# Patient Record
Sex: FEMALE | Race: BLACK OR AFRICAN AMERICAN | Employment: FULL TIME | URBAN - METROPOLITAN AREA
[De-identification: names, ages, dates, MRNs, and addresses within clinical notes are randomized per-mention and may not be internally consistent; named-entity substitution may affect disease eponyms.]

---

## 2021-05-18 LAB
ANTIBODY SCREEN, EXTERNAL: NEGATIVE
HBSAG, EXTERNAL: NEGATIVE
HIV, EXTERNAL: NONREACTIVE
RUBELLA, EXTERNAL: NORMAL
T. PALLIDUM, EXTERNAL: NONREACTIVE
TYPE, ABO & RH, EXTERNAL: NORMAL

## 2021-11-26 LAB — GRBS, EXTERNAL: POSITIVE

## 2021-12-03 ENCOUNTER — HOSPITAL ENCOUNTER (INPATIENT)
Age: 31
LOS: 3 days | Discharge: HOME | End: 2021-12-06
Attending: OBSTETRICS & GYNECOLOGY | Admitting: OBSTETRICS & GYNECOLOGY
Payer: COMMERCIAL

## 2021-12-03 DIAGNOSIS — R52 POSTPARTUM PAIN: Primary | ICD-10-CM

## 2021-12-03 PROBLEM — O13.9 GESTATIONAL HYPERTENSION: Status: ACTIVE | Noted: 2021-12-03

## 2021-12-03 PROBLEM — O24.419 GESTATIONAL DIABETES: Status: ACTIVE | Noted: 2021-12-03

## 2021-12-03 PROBLEM — O36.8190 DECREASED FETAL MOVEMENT: Status: ACTIVE | Noted: 2021-12-03

## 2021-12-03 LAB
ALBUMIN SERPL-MCNC: 3 G/DL (ref 3.5–5)
ALBUMIN/GLOB SERPL: 0.8 {RATIO} (ref 1.1–2.2)
ALP SERPL-CCNC: 202 U/L (ref 45–117)
ALT SERPL-CCNC: 19 U/L (ref 12–78)
ANION GAP SERPL CALC-SCNC: 11 MMOL/L (ref 5–15)
AST SERPL-CCNC: 28 U/L (ref 15–37)
BILIRUB SERPL-MCNC: 0.9 MG/DL (ref 0.2–1)
BUN SERPL-MCNC: 3 MG/DL (ref 6–20)
BUN/CREAT SERPL: 6 (ref 12–20)
CALCIUM SERPL-MCNC: 9 MG/DL (ref 8.5–10.1)
CHLORIDE SERPL-SCNC: 107 MMOL/L (ref 97–108)
CO2 SERPL-SCNC: 20 MMOL/L (ref 21–32)
COVID-19 RAPID TEST, COVR: NOT DETECTED
CREAT SERPL-MCNC: 0.5 MG/DL (ref 0.55–1.02)
CREAT UR-MCNC: 59 MG/DL
ERYTHROCYTE [DISTWIDTH] IN BLOOD BY AUTOMATED COUNT: 13 % (ref 11.5–14.5)
GLOBULIN SER CALC-MCNC: 3.8 G/DL (ref 2–4)
GLUCOSE SERPL-MCNC: 66 MG/DL (ref 65–100)
HCT VFR BLD AUTO: 31.9 % (ref 35–47)
HGB BLD-MCNC: 10.8 G/DL (ref 11.5–16)
MCH RBC QN AUTO: 27.6 PG (ref 26–34)
MCHC RBC AUTO-ENTMCNC: 33.9 G/DL (ref 30–36.5)
MCV RBC AUTO: 81.4 FL (ref 80–99)
NRBC # BLD: 0 K/UL (ref 0–0.01)
NRBC BLD-RTO: 0 PER 100 WBC
PLATELET # BLD AUTO: 220 K/UL (ref 150–400)
PMV BLD AUTO: 10.3 FL (ref 8.9–12.9)
POTASSIUM SERPL-SCNC: 3.7 MMOL/L (ref 3.5–5.1)
PROT SERPL-MCNC: 6.8 G/DL (ref 6.4–8.2)
PROT UR-MCNC: 7 MG/DL (ref 0–11.9)
PROT/CREAT UR-RTO: 0.1
RBC # BLD AUTO: 3.92 M/UL (ref 3.8–5.2)
SODIUM SERPL-SCNC: 138 MMOL/L (ref 136–145)
SOURCE, COVRS: NORMAL
WBC # BLD AUTO: 10.7 K/UL (ref 3.6–11)

## 2021-12-03 PROCEDURE — 59200 INSERT CERVICAL DILATOR: CPT

## 2021-12-03 PROCEDURE — 74011250636 HC RX REV CODE- 250/636: Performed by: OBSTETRICS & GYNECOLOGY

## 2021-12-03 PROCEDURE — 74011000258 HC RX REV CODE- 258: Performed by: OBSTETRICS & GYNECOLOGY

## 2021-12-03 PROCEDURE — 85027 COMPLETE CBC AUTOMATED: CPT

## 2021-12-03 PROCEDURE — 87635 SARS-COV-2 COVID-19 AMP PRB: CPT

## 2021-12-03 PROCEDURE — 36415 COLL VENOUS BLD VENIPUNCTURE: CPT

## 2021-12-03 PROCEDURE — 65270000029 HC RM PRIVATE

## 2021-12-03 PROCEDURE — 75410000002 HC LABOR FEE PER 1 HR

## 2021-12-03 PROCEDURE — 84156 ASSAY OF PROTEIN URINE: CPT

## 2021-12-03 PROCEDURE — 80053 COMPREHEN METABOLIC PANEL: CPT

## 2021-12-03 RX ORDER — ONDANSETRON 2 MG/ML
4 INJECTION INTRAMUSCULAR; INTRAVENOUS
Status: DISCONTINUED | OUTPATIENT
Start: 2021-12-03 | End: 2021-12-04 | Stop reason: HOSPADM

## 2021-12-03 RX ORDER — TERBUTALINE SULFATE 1 MG/ML
0.25 INJECTION SUBCUTANEOUS AS NEEDED
Status: DISCONTINUED | OUTPATIENT
Start: 2021-12-03 | End: 2021-12-04 | Stop reason: HOSPADM

## 2021-12-03 RX ORDER — SODIUM CHLORIDE, SODIUM LACTATE, POTASSIUM CHLORIDE, CALCIUM CHLORIDE 600; 310; 30; 20 MG/100ML; MG/100ML; MG/100ML; MG/100ML
125 INJECTION, SOLUTION INTRAVENOUS CONTINUOUS
Status: DISCONTINUED | OUTPATIENT
Start: 2021-12-03 | End: 2021-12-05

## 2021-12-03 RX ORDER — OXYTOCIN/RINGER'S LACTATE 30/500 ML
10 PLASTIC BAG, INJECTION (ML) INTRAVENOUS AS NEEDED
Status: DISCONTINUED | OUTPATIENT
Start: 2021-12-03 | End: 2021-12-04 | Stop reason: HOSPADM

## 2021-12-03 RX ORDER — OXYTOCIN/RINGER'S LACTATE 30/500 ML
87.3 PLASTIC BAG, INJECTION (ML) INTRAVENOUS AS NEEDED
Status: DISCONTINUED | OUTPATIENT
Start: 2021-12-03 | End: 2021-12-04 | Stop reason: HOSPADM

## 2021-12-03 RX ORDER — ZOLPIDEM TARTRATE 5 MG/1
5 TABLET ORAL
Status: DISCONTINUED | OUTPATIENT
Start: 2021-12-03 | End: 2021-12-04 | Stop reason: HOSPADM

## 2021-12-03 RX ORDER — BUTORPHANOL TARTRATE 1 MG/ML
1 INJECTION INTRAMUSCULAR; INTRAVENOUS
Status: DISCONTINUED | OUTPATIENT
Start: 2021-12-03 | End: 2021-12-04

## 2021-12-03 RX ORDER — SODIUM CHLORIDE 0.9 % (FLUSH) 0.9 %
5-40 SYRINGE (ML) INJECTION AS NEEDED
Status: DISCONTINUED | OUTPATIENT
Start: 2021-12-03 | End: 2021-12-04 | Stop reason: HOSPADM

## 2021-12-03 RX ORDER — OXYTOCIN/RINGER'S LACTATE 30/500 ML
0-20 PLASTIC BAG, INJECTION (ML) INTRAVENOUS
Status: DISCONTINUED | OUTPATIENT
Start: 2021-12-04 | End: 2021-12-04 | Stop reason: HOSPADM

## 2021-12-03 RX ORDER — SODIUM CHLORIDE 0.9 % (FLUSH) 0.9 %
5-40 SYRINGE (ML) INJECTION EVERY 8 HOURS
Status: DISCONTINUED | OUTPATIENT
Start: 2021-12-03 | End: 2021-12-04 | Stop reason: HOSPADM

## 2021-12-03 RX ADMIN — BUTORPHANOL TARTRATE 1 MG: 1 INJECTION, SOLUTION INTRAMUSCULAR; INTRAVENOUS at 22:12

## 2021-12-03 RX ADMIN — PROMETHAZINE HYDROCHLORIDE 25 MG: 25 INJECTION INTRAMUSCULAR; INTRAVENOUS at 22:13

## 2021-12-03 RX ADMIN — SODIUM CHLORIDE, POTASSIUM CHLORIDE, SODIUM LACTATE AND CALCIUM CHLORIDE 125 ML/HR: 600; 310; 30; 20 INJECTION, SOLUTION INTRAVENOUS at 22:13

## 2021-12-03 NOTE — H&P
History & Physical    Name: Collette Nick MRN: [id number]  SSN: [id number]    YOB: 1990  Age: 33 y.o. Sex: female        Subjective:     Estimated Date of Delivery: None noted. OB History    Para Term  AB Living   1             SAB IAB Ectopic Molar Multiple Live Births                    # Outcome Date GA Lbr Bonilla/2nd Weight Sex Delivery Anes PTL Lv   1 Current                Ms. Aden Quinones is admitted with pregnancy at 41 0/7 for induction of labor. Prenatal course was complicated by GDMA1, GHTN, GBS positive with PCN and keflex allergies resistant to clindamycin, h/o HSV with no s/sx of active infection today. Please see prenatal records for details. Past Medical History:   Diagnosis Date    Delivery normal     vaginal birth     Past Surgical History:   Procedure Laterality Date    HX HEENT      wisdom teeth removed     Social History     Occupational History    Not on file   Tobacco Use    Smoking status: Never Smoker    Smokeless tobacco: Not on file   Substance and Sexual Activity    Alcohol use: Yes     Comment: occasionally    Drug use: No    Sexual activity: Not on file     No family history on file. Allergies   Allergen Reactions    Pcn [Penicillins] Rash    Tylenol [Acetaminophen] Rash     Prior to Admission medications    Medication Sig Start Date End Date Taking? Authorizing Provider   HYDROCODONE BIT/ACETAMINOPHEN (LORTAB PO) Take  by mouth. Frieda Garcia MD   IBUPROFEN (MOTRIN PO) Take  by mouth. Frieda Garcia MD   permethrin (ELIMITE) 5 % topical cream apply sparingly as directed 12/15/12   Saumya Sanchez MD        Review of Systems: Pertinent items are noted in HPI. Objective:     Vitals: There were no vitals filed for this visit. Physical Exam:  Patient without distress.   Abdomen: soft, nontender, gravid  Cervical Exam: ftp  50% effaced    -3 station    Presenting Part: cephalic  Cervical Position: posterior  Consistency: Firm  Membranes: Intact  Fetal Heart Rate: Reactive  Baseline: 150s per minute  Variability: moderate  Accelerations: yes  Decelerations: none  Uterine contractions: none    Prenatal Labs:   No results found for: ABORH, RUBELLAEXT, GRBSEXT, HBSAGEXT, HIVEXT, RPREXT, GONNOEXT, CHLAMEXT, ABORHEXT, RUBELLAEXT, GRBSEXT, HBSAGEXT, HIVEXT, RPREXT, GONNOEXT, CHLAMEXT      Assessment/Plan:     Active Problems:    Gestational diabetes (12/3/2021)      Gestational hypertension (12/3/2021)      Decreased fetal movement (12/3/2021)         Plan: Admit for induction of labor for GDMA1, GHTN with decreased fetal movement at 37 weeks. Group B Strep was positive, will treat prophylactically with vancomycin given PCN and Keflex allergies and clindamycin resistance. H/o hsv-no lesions on exam and no s/sx of outbreak. Plan for [facility] catheter placement when able and cytotec 25mcg q4 h x 3 doses. Signed out to Dr Radha Ignacio who will take over care from here.

## 2021-12-04 ENCOUNTER — ANESTHESIA EVENT (OUTPATIENT)
Dept: LABOR AND DELIVERY | Age: 31
End: 2021-12-04
Payer: COMMERCIAL

## 2021-12-04 ENCOUNTER — ANESTHESIA (OUTPATIENT)
Dept: LABOR AND DELIVERY | Age: 31
End: 2021-12-04
Payer: COMMERCIAL

## 2021-12-04 LAB
GLUCOSE BLD STRIP.AUTO-MCNC: 92 MG/DL (ref 65–117)
SERVICE CMNT-IMP: NORMAL

## 2021-12-04 PROCEDURE — 3E0DXGC INTRODUCTION OF OTHER THERAPEUTIC SUBSTANCE INTO MOUTH AND PHARYNX, EXTERNAL APPROACH: ICD-10-PCS | Performed by: OBSTETRICS & GYNECOLOGY

## 2021-12-04 PROCEDURE — 75410000000 HC DELIVERY VAGINAL/SINGLE

## 2021-12-04 PROCEDURE — 74011000250 HC RX REV CODE- 250: Performed by: ANESTHESIOLOGY

## 2021-12-04 PROCEDURE — 74011250637 HC RX REV CODE- 250/637: Performed by: ADVANCED PRACTICE MIDWIFE

## 2021-12-04 PROCEDURE — 65410000002 HC RM PRIVATE OB

## 2021-12-04 PROCEDURE — 74011250637 HC RX REV CODE- 250/637: Performed by: OBSTETRICS & GYNECOLOGY

## 2021-12-04 PROCEDURE — 10907ZC DRAINAGE OF AMNIOTIC FLUID, THERAPEUTIC FROM PRODUCTS OF CONCEPTION, VIA NATURAL OR ARTIFICIAL OPENING: ICD-10-PCS | Performed by: OBSTETRICS & GYNECOLOGY

## 2021-12-04 PROCEDURE — 74011250636 HC RX REV CODE- 250/636: Performed by: ADVANCED PRACTICE MIDWIFE

## 2021-12-04 PROCEDURE — 3E033VJ INTRODUCTION OF OTHER HORMONE INTO PERIPHERAL VEIN, PERCUTANEOUS APPROACH: ICD-10-PCS | Performed by: OBSTETRICS & GYNECOLOGY

## 2021-12-04 PROCEDURE — 00HU33Z INSERTION OF INFUSION DEVICE INTO SPINAL CANAL, PERCUTANEOUS APPROACH: ICD-10-PCS | Performed by: ANESTHESIOLOGY

## 2021-12-04 PROCEDURE — 82962 GLUCOSE BLOOD TEST: CPT

## 2021-12-04 PROCEDURE — 74011250636 HC RX REV CODE- 250/636: Performed by: ANESTHESIOLOGY

## 2021-12-04 PROCEDURE — 76060000078 HC EPIDURAL ANESTHESIA

## 2021-12-04 PROCEDURE — 74011250636 HC RX REV CODE- 250/636: Performed by: OBSTETRICS & GYNECOLOGY

## 2021-12-04 PROCEDURE — 75410000003 HC RECOV DEL/VAG/CSECN EA 0.5 HR

## 2021-12-04 PROCEDURE — 75410000002 HC LABOR FEE PER 1 HR

## 2021-12-04 RX ORDER — FENTANYL CITRATE 50 UG/ML
INJECTION, SOLUTION INTRAMUSCULAR; INTRAVENOUS AS NEEDED
Status: DISCONTINUED | OUTPATIENT
Start: 2021-12-04 | End: 2021-12-04 | Stop reason: HOSPADM

## 2021-12-04 RX ORDER — HYDROCORTISONE ACETATE PRAMOXINE HCL 2.5; 1 G/100G; G/100G
CREAM TOPICAL AS NEEDED
Status: DISCONTINUED | OUTPATIENT
Start: 2021-12-04 | End: 2021-12-06 | Stop reason: HOSPADM

## 2021-12-04 RX ORDER — IBUPROFEN 400 MG/1
800 TABLET ORAL EVERY 8 HOURS
Status: DISCONTINUED | OUTPATIENT
Start: 2021-12-04 | End: 2021-12-06 | Stop reason: HOSPADM

## 2021-12-04 RX ORDER — BUPIVACAINE HYDROCHLORIDE 2.5 MG/ML
INJECTION, SOLUTION EPIDURAL; INFILTRATION; INTRACAUDAL
Status: COMPLETED
Start: 2021-12-04 | End: 2021-12-04

## 2021-12-04 RX ORDER — NALOXONE HYDROCHLORIDE 0.4 MG/ML
0.4 INJECTION, SOLUTION INTRAMUSCULAR; INTRAVENOUS; SUBCUTANEOUS AS NEEDED
Status: DISCONTINUED | OUTPATIENT
Start: 2021-12-04 | End: 2021-12-04 | Stop reason: HOSPADM

## 2021-12-04 RX ORDER — BUPIVACAINE HYDROCHLORIDE 2.5 MG/ML
INJECTION, SOLUTION EPIDURAL; INFILTRATION; INTRACAUDAL AS NEEDED
Status: DISCONTINUED | OUTPATIENT
Start: 2021-12-04 | End: 2021-12-04 | Stop reason: HOSPADM

## 2021-12-04 RX ORDER — LIDOCAINE HYDROCHLORIDE AND EPINEPHRINE 15; 5 MG/ML; UG/ML
INJECTION, SOLUTION EPIDURAL
Status: COMPLETED | OUTPATIENT
Start: 2021-12-04 | End: 2021-12-04

## 2021-12-04 RX ORDER — SIMETHICONE 80 MG
80 TABLET,CHEWABLE ORAL
Status: DISCONTINUED | OUTPATIENT
Start: 2021-12-04 | End: 2021-12-06 | Stop reason: HOSPADM

## 2021-12-04 RX ORDER — FENTANYL/BUPIVACAINE/NS/PF 2-1250MCG
1-16 PREFILLED PUMP RESERVOIR EPIDURAL CONTINUOUS
Status: DISCONTINUED | OUTPATIENT
Start: 2021-12-04 | End: 2021-12-04 | Stop reason: HOSPADM

## 2021-12-04 RX ORDER — OXYCODONE HYDROCHLORIDE 5 MG/1
5 TABLET ORAL
Status: DISCONTINUED | OUTPATIENT
Start: 2021-12-04 | End: 2021-12-06 | Stop reason: HOSPADM

## 2021-12-04 RX ORDER — FENTANYL CITRATE 50 UG/ML
INJECTION, SOLUTION INTRAMUSCULAR; INTRAVENOUS
Status: COMPLETED
Start: 2021-12-04 | End: 2021-12-04

## 2021-12-04 RX ORDER — LIDOCAINE HYDROCHLORIDE 20 MG/ML
INJECTION, SOLUTION EPIDURAL; INFILTRATION; INTRACAUDAL; PERINEURAL AS NEEDED
Status: DISCONTINUED | OUTPATIENT
Start: 2021-12-04 | End: 2021-12-04 | Stop reason: HOSPADM

## 2021-12-04 RX ORDER — EPHEDRINE SULFATE/0.9% NACL/PF 50 MG/5 ML
10 SYRINGE (ML) INTRAVENOUS
Status: DISCONTINUED | OUTPATIENT
Start: 2021-12-04 | End: 2021-12-04 | Stop reason: HOSPADM

## 2021-12-04 RX ORDER — OXYTOCIN/RINGER'S LACTATE 30/500 ML
87.3 PLASTIC BAG, INJECTION (ML) INTRAVENOUS AS NEEDED
Status: DISCONTINUED | OUTPATIENT
Start: 2021-12-04 | End: 2021-12-06 | Stop reason: HOSPADM

## 2021-12-04 RX ORDER — BUTORPHANOL TARTRATE 1 MG/ML
2 INJECTION INTRAMUSCULAR; INTRAVENOUS
Status: DISCONTINUED | OUTPATIENT
Start: 2021-12-04 | End: 2021-12-04 | Stop reason: HOSPADM

## 2021-12-04 RX ORDER — NALOXONE HYDROCHLORIDE 0.4 MG/ML
0.4 INJECTION, SOLUTION INTRAMUSCULAR; INTRAVENOUS; SUBCUTANEOUS AS NEEDED
Status: DISCONTINUED | OUTPATIENT
Start: 2021-12-04 | End: 2021-12-06 | Stop reason: HOSPADM

## 2021-12-04 RX ORDER — FENTANYL CITRATE 50 UG/ML
100 INJECTION, SOLUTION INTRAMUSCULAR; INTRAVENOUS ONCE
Status: DISCONTINUED | OUTPATIENT
Start: 2021-12-04 | End: 2021-12-04 | Stop reason: HOSPADM

## 2021-12-04 RX ORDER — OXYTOCIN/RINGER'S LACTATE 30/500 ML
10 PLASTIC BAG, INJECTION (ML) INTRAVENOUS AS NEEDED
Status: DISCONTINUED | OUTPATIENT
Start: 2021-12-04 | End: 2021-12-06 | Stop reason: HOSPADM

## 2021-12-04 RX ADMIN — LIDOCAINE HYDROCHLORIDE AND EPINEPHRINE 0.5 ML: 15; 5 INJECTION, SOLUTION EPIDURAL at 15:34

## 2021-12-04 RX ADMIN — FENTANYL CITRATE 100 MCG: 50 INJECTION, SOLUTION INTRAMUSCULAR; INTRAVENOUS at 15:17

## 2021-12-04 RX ADMIN — OXYTOCIN 1 MILLI-UNITS/MIN: 10 INJECTION INTRAVENOUS at 08:52

## 2021-12-04 RX ADMIN — FENTANYL CITRATE 100 MCG: 50 INJECTION, SOLUTION INTRAMUSCULAR; INTRAVENOUS at 15:45

## 2021-12-04 RX ADMIN — BUPIVACAINE HYDROCHLORIDE 5 ML: 2.5 INJECTION, SOLUTION EPIDURAL; INFILTRATION; INTRACAUDAL; PERINEURAL at 15:19

## 2021-12-04 RX ADMIN — LIDOCAINE HYDROCHLORIDE 5 ML: 20 INJECTION, SOLUTION EPIDURAL; INFILTRATION; INTRACAUDAL; PERINEURAL at 15:42

## 2021-12-04 RX ADMIN — BUTORPHANOL TARTRATE 2 MG: 1 INJECTION, SOLUTION INTRAMUSCULAR; INTRAVENOUS at 14:07

## 2021-12-04 RX ADMIN — BUTORPHANOL TARTRATE 2 MG: 1 INJECTION, SOLUTION INTRAMUSCULAR; INTRAVENOUS at 08:48

## 2021-12-04 RX ADMIN — IBUPROFEN 800 MG: 400 TABLET, FILM COATED ORAL at 18:52

## 2021-12-04 RX ADMIN — LIDOCAINE HYDROCHLORIDE,EPINEPHRINE BITARTRATE 4.5 ML: 15; .005 INJECTION, SOLUTION EPIDURAL; INFILTRATION; INTRACAUDAL; PERINEURAL at 15:42

## 2021-12-04 RX ADMIN — MISOPROSTOL 25 MCG: 100 TABLET ORAL at 03:50

## 2021-12-04 RX ADMIN — Medication 10 ML/HR: at 15:17

## 2021-12-04 RX ADMIN — LIDOCAINE HYDROCHLORIDE,EPINEPHRINE BITARTRATE 0.5 ML: 15; .005 INJECTION, SOLUTION EPIDURAL; INFILTRATION; INTRACAUDAL; PERINEURAL at 15:13

## 2021-12-04 RX ADMIN — LIDOCAINE HYDROCHLORIDE,EPINEPHRINE BITARTRATE 4.5 ML: 15; .005 INJECTION, SOLUTION EPIDURAL; INFILTRATION; INTRACAUDAL; PERINEURAL at 15:16

## 2021-12-04 RX ADMIN — BUTORPHANOL TARTRATE 1 MG: 1 INJECTION, SOLUTION INTRAMUSCULAR; INTRAVENOUS at 03:05

## 2021-12-04 RX ADMIN — VANCOMYCIN HYDROCHLORIDE 1000 MG: 1 INJECTION, POWDER, LYOPHILIZED, FOR SOLUTION INTRAVENOUS at 09:01

## 2021-12-04 RX ADMIN — SODIUM CHLORIDE, POTASSIUM CHLORIDE, SODIUM LACTATE AND CALCIUM CHLORIDE 1000 ML: 600; 310; 30; 20 INJECTION, SOLUTION INTRAVENOUS at 12:27

## 2021-12-04 NOTE — PROGRESS NOTES
[age]year old  @ 37 wks admitted ambulatory to LDR 8. Under s/o Jayla Lafleur [facility] hospitalist) on call. Here for induction of labor. Pregnancy complicated by gestational hypertension & diabetes (diet controlled). [address] care of pt after SBAR from Homar, adelina. H& P in progress.  IV start in rt forearm by Lenin LONDONO. Lab draw: cmp, cbc, & stbb. Saline locked. [id number] Consent for delivery and anesthesia completed by pt.     Yessy Michael on call for VPFW @ bedside. Discussed POC. [address] cath placed by Cristian Hollis MD with 60/60 balloons.  Rapid Covid test by Homar.  Bedside and Verbal shift change report given to Johanny Noriega RN (oncoming nurse) by Shawn Townsend RN (offgoing nurse). Report included the following information SBAR, Procedure Summary, Accordion and Recent Results.

## 2021-12-04 NOTE — PROGRESS NOTES
[facility] Labor Progress Note     Patient: Collette Nick MRN: [id number]  SSN: [id number]    YOB: 1990  Age: 33 y.o. Sex: female        Subjective:   Patient now comfortable with epidural.  at bedside providing support. Objective:   Blood pressure 122/77, pulse 90, temperature 98.1 °F (36.7 °C), resp. rate 16, height 5' 7\" (1.702 m), weight 90.3 kg (199 lb), not currently breastfeeding. Patient Vitals for the past 4 hrs: Mode Fetal Heart Rate Variability Decelerations Accelerations RN Reviewed Strip? 21 1515 External 130 -- -- -- Yes   21 1345 External 145 -- -- -- Yes   21 1330 US Readjusted; External 145 (!) Less than or equal to 5 BPM None No Yes   21 1315 External 145 -- -- -- Yes   21 1300 External 150 6-25 BPM None Yes Yes   21 1245 External 150 -- -- -- Yes   21 1230 External 145 6-25 BPM None No Yes   21 1215 External 145 -- -- -- Yes     Uterine contractions q 2 minutes, strong to palpation, resting tone soft, Sterile Vaginal Exam: 8 cm dilated/ 80% effaced/ -1 station, cervix anterior, fetal presentation vertex, membranes ruptured for continues light meconium.      Pitocin at  10 mu/min    Assessment:     37w1d  Category 2 fetal heart rate tracing   IOL for GDMA1, Hx GHTN, and decreased fetal movement    Plan:   Continue pitocin  If variables continue place IUPC for amnioinfusion  Cont maternal position changes  Cont maternal and fetal monitoring per protocol  Anticipate LAURA Pereira CNM

## 2021-12-04 NOTE — PROGRESS NOTES
[facility] Labor Progress Note     Patient: Vaishnavi Coffman MRN: [id number]  SSN: [id number]    YOB: 1990  Age: 33 y.o. Sex: female      Care assumed at 0800    Subjective:   Patient coping well with contractions, but ready for some pain medication. They are becoming stronger.  is at bedside providing support. Objective:   Blood pressure (!) 144/81, pulse 77, temperature 98.2 °F (36.8 °C), resp. rate 16, height 5' 7\" (1.702 m), weight 90.3 kg (199 lb), not currently breastfeeding. Patient Vitals for the past 4 hrs: Mode Fetal Heart Rate Variability Decelerations Accelerations RN Reviewed Strip? 21 0730 External 155 6-25 BPM None Yes Yes   21 0700 External 155 6-25 BPM None Yes Yes   21 0630 External 150 6-25 BPM None Yes Yes   21 0600 External 150 6-25 BPM None Yes Yes   21 0530 External 140 6-25 BPM None Yes Yes   21 0500 External 150 6-25 BPM None Yes Yes     Uterine contractions q 4 minutes, moderate to palpation, resting tone soft, Sterile Vaginal Exam: 4 cm dilated/ 70 % effaced/ -2 station, cervix posterior , fetal presentation vertex, membranes intact    S/p cytotec x 1    Assessment:     37w1d  Category 1 fetal heart rate tracing   IOL  GDMA1  GHTN  Decreased fetal movement    Plan:   Introduced self to patient. SVE, ripened cervix, will proceed with pitocin. Discussed POC with pt, pt agreeable. Pitocin ordered, titrate to an adequate contraction pattern as fetus tolerates.    Recheck cervix 4 hours after adequate ctx pattern achieved or sooner with maternal or fetal indication  Cont maternal and fetal monitoring per protocol  Anticipate     Lucy Munoz CNM

## 2021-12-04 NOTE — PROGRESS NOTES
In to introduce myself to patient and place a CRB per Dr. Parmar Prom request.    Evertt Dock exam performed. Cervix visualized. CRB placed under direct visualization. 60/60 placed and tolerated by pt. Orders per Dr. Allison Whitfield. Myla for GBS. Cytotec along with CRB.

## 2021-12-04 NOTE — L&D DELIVERY NOTE
CNM Delivery Note       Patient: Andrade Jay MRN: [id number]  SSN: [id number]    YOB: 1990  Age: 33 y.o. Sex: female         of a live male infant at 200 with Apgars 9,9 in KARELY position under  epidural anesthesia with mother in stirrups position. Shoulders spontaneous, easily delivered with maternal effort. Vigorous infant placed on maternal abdomen immediately following delivery. Once cord stopped pulsating it was double clamped by CNM and cut by FOB. Cord blood collected and sent to lab. Placenta and membranes spontaneous, intact, with 3 vessel cord via Janyce Hock mechanism at [email or web address]. Fundus massaged to firm. Estimated blood loss 350 mL. Vagina and perineum inspected. Perineum intact. Mother and infant stable, bonding, establishing breastfeeding. Delivery Summary    Patient: Andrade Jay MRN: [id number]  SSN: [id number]    YOB: 1990  Age: 33 y.o. Sex: female       Information for the patient's :  Rowdy Del Real [[id number]]       Labor Events:    Labor: No    Steroids: None   Cervical Ripening Date/Time: 2021 3:50 AM   Cervical Ripening Type: Misoprostol   Antibiotics During Labor: Yes   Rupture Identifier:      Rupture Date/Time: 2021 1:37 PM   Rupture Type: AROM   Amniotic Fluid Volume:  Moderate    Amniotic Fluid Description: Blood stained    Amniotic Fluid Odor: None    Induction:         Induction Date/Time:        Indications for Induction:      Augmentation: Oxytocin   Augmentation Date/Time: 18:52 AM   Indications for Augmentation: Ineffective Contraction Pattern   Labor complications: None       Additional complications:        Delivery Events:  Indications For Episiotomy:     Episiotomy: None   Perineal Laceration(s): None   Repaired:     Periurethral Laceration Location:      Repaired:     Labial Laceration Location:     Repaired:     Sulcal Laceration Location:     Repaired:     Vaginal Laceration Location: Repaired:     Cervical Laceration Location:     Repaired:     Repair Suture: None   Number of Repair Packets:     Estimated Blood Loss (ml): 350ml   Quantitative Blood Loss (ml)                Delivery Date: 2021    Delivery Time: 4:23 PM  Delivery Type: Vaginal, Spontaneous  Sex:  Male    Gestational Age: 42w4d   Delivery Clinician:  Ivan Tripp  Living Status: Living   Delivery Location: L&D room 8          APGARS  One minute Five minutes Ten minutes   Skin color: 1   1        Heart rate: 2   2        Grimace: 2   2        Muscle tone: 2   2        Breathin   2        Totals: 9   9            Presentation: Vertex    Position: Left Occiput Anterior  Resuscitation Method:  Suctioning-deep; Tactile Stimulation;C-PAP;Suctioning-bulb     Meconium Stained: None      Cord Information: 3 Vessels  Complications: None  Cord around:    Delayed cord clamping? Yes  Cord clamped date/time:2021  4:25 PM  Disposition of Cord Blood: Lab    Blood Gases Sent?: No    Placenta:  Date/Time: 2021  4:28 PM  Removal: Spontaneous      Appearance: Normal;Intact      Measurements:  Birth Weight: 2.91 kg      Birth Length:        Head Circumference:        Chest Circumference:       Abdominal Girth:       Other Providers:   JORDY Ibarra EA;BRIE MOORE;Pierce SENA, Midwife;Primary Nurse;Primary [place] Nurse;Charge Nurse;Neonatologist;Nurse Practitioner           Group B Strep:   Lab Results   Component Value Date/Time    GrERNESTOtreluciano, External positive 2021 12:00 AM     Information for the patient's :  Mitzy Miranda [[id number]]     Lab Results   Component Value Date/Time    ABO/Rh(D) O POSITIVE 2021 04:47 PM    JOSSELYN IgG NEG 2021 04:47 PM    Bilirubin if JOSSELYN pos: IF DIRECT ZACKARY POSITIVE, BILIRUBIN TO FOLLOW 2021 04:47 PM      No results for input(s): PCO2CB, PO2CB, HCO3I, SO2I, IBD, PTEMPI, SPECTI, PHICB, ISITE, IDEV, IALLEN in the last 72 hours.

## 2021-12-04 NOTE — PROGRESS NOTES
1930: Bedside and Verbal shift change report given to 2525 Trino Wilkerson (oncoming nurse) by Tj Gonsalves RN (offgoing nurse). Report included the following information SBAR, Intake/Output, MAR and Recent Results. 0730: Bedside and Verbal shift change report given to Pascale Davis RN (oncoming nurse) by Rock Peralta RN (offgoing nurse). Report included the following information SBAR, Intake/Output, MAR and Recent Results.

## 2021-12-04 NOTE — ANESTHESIA PREPROCEDURE EVALUATION
Relevant Problems   CARDIOVASCULAR   (+) Gestational hypertension      ENDOCRINE   (+) Gestational diabetes       Anesthetic History   No history of anesthetic complications            Review of Systems / Medical History  Patient summary reviewed, nursing notes reviewed and pertinent labs reviewed    Pulmonary  Within defined limits                 Neuro/Psych   Within defined limits           Cardiovascular    Hypertension              Exercise tolerance: >4 METS     GI/Hepatic/Renal  Within defined limits              Endo/Other    Diabetes         Other Findings              Physical Exam    Airway  Mallampati: II  TM Distance: 4 - 6 cm  Neck ROM: normal range of motion   Mouth opening: Normal     Cardiovascular  Regular rate and rhythm,  S1 and S2 normal,  no murmur, click, rub, or gallop             Dental  No notable dental hx       Pulmonary  Breath sounds clear to auscultation               Abdominal  GI exam deferred       Other Findings            Anesthetic Plan    ASA: 2  Anesthesia type: epidural            Anesthetic plan and risks discussed with: Patient

## 2021-12-04 NOTE — ANESTHESIA PROCEDURE NOTES
Epidural Block    Patient location during procedure: OB  Start time: 12/4/2021 3:13 PM  End time: 12/4/2021 3:20 PM  Reason for block: labor epidural  Staffing  Performed: attending   Preanesthetic Checklist  Completed: patient identified, IV checked, site marked, risks and benefits discussed, surgical consent, monitors and equipment checked, pre-op evaluation and timeout performed  Block Placement  Patient position: sitting  Prep: ChloraPrep  Sterility prep: cap, drape, gloves and mask  Sedation level: no sedation  Patient monitoring: continuous pulse oximetry and heart rate  Approach: midline  Location: lumbar  Lumbar location: L4-L5  Epidural  Loss of resistance technique: saline  Guidance: landmark technique  Needle  Needle type: Tuohy   Needle gauge: 17 G  Needle length: 9 cm  Needle insertion depth: 7 cm  Catheter type: end hole  Catheter size: 19 G  Catheter at skin depth: 12 cm  Catheter securement method: clear occlusive dressing, liquid medical adhesive and surgical tape  Test dose: negative  Medications Administered  Lidocaine-EPINEPHrine (XYLOCAINE) 1.5 %-1:200,000 Epidural, 0.5 mL  Assessment  Number of attempts: 2  Procedure assessment: patient tolerated procedure well with no immediate complications

## 2021-12-04 NOTE — ANESTHESIA PROCEDURE NOTES
Epidural Block    Patient location during procedure: OB  Start time: 12/4/2021 3:34 PM  End time: 12/4/2021 3:44 PM  Reason for block: labor epidural  Staffing  Performed: attending   Preanesthetic Checklist  Completed: patient identified, IV checked, site marked, risks and benefits discussed, surgical consent, monitors and equipment checked, pre-op evaluation and timeout performed  Block Placement  Patient position: sitting  Prep: DuraPrep  Sterility prep: cap, drape, gloves, mask and hand  Sedation level: no sedation  Patient monitoring: continuous pulse oximetry and heart rate  Approach: midline  Location: lumbar  Lumbar location: L2-L3  Epidural  Loss of resistance technique: air and saline  Guidance: landmark technique  Needle  Needle type: Tuohy   Needle gauge: 17 G  Needle length: 9 cm  Needle insertion depth: 6 cm  Catheter type: end hole  Catheter size: 19 G  Catheter at skin depth: 10.5 cm  Catheter securement method: clear occlusive dressing and surgical tape  Test dose: negative  Medications Administered  Lidocaine-EPINEPHrine (XYLOCAINE) 1.5 %-1:200,000 Epidural, 0.5 mL  Assessment  Sensory level: T10  Block outcome: pain improved  Number of attempts: 1  Procedure assessment: patient tolerated procedure well with no immediate complications

## 2021-12-04 NOTE — PROGRESS NOTES
[facility] Labor Progress Note     Patient: Rubén Andrews MRN: [id number]  SSN: [id number]    YOB: 1990  Age: 33 y.o. Sex: female        Subjective:   Patient coping well with contractions, sitting on birth ball. Reports contractions are more intense. Objective:   Blood pressure 122/77, pulse 90, temperature 98.1 °F (36.7 °C), resp. rate 16, height 5' 7\" (1.702 m), weight 90.3 kg (199 lb), not currently breastfeeding. Patient Vitals for the past 4 hrs: Mode Fetal Heart Rate Fetal Activity Variability Decelerations Accelerations RN Reviewed Strip? 21 1315 External 145 -- -- -- -- Yes   21 1245 External 150 -- -- -- -- Yes   21 1215 External 145 -- -- -- -- Yes   21 1200 External 145 -- 6-25 BPM None No Yes   21 1145 External 145 -- -- -- -- Yes   21 1130 External 140 -- 6-25 BPM None No Yes   21 1115 External 140 -- -- -- -- Yes   21 1045 External 130 -- -- -- -- Yes   21 1030 External 130 Present 6-25 BPM None No Yes   21 1015 External 130 -- -- -- -- Yes   21 1000 External 130 -- 6-25 BPM None No Yes     Uterine contractions q 2-3 minutes, moderate to palpation, resting tone soft, Sterile Vaginal Exam: 4 cm dilated/ 80 % effaced/ -2 station, cervix midline, fetal presentation vertex, membranes AROM for small amount of clear or very light meconium stained fluid    Pitocin at 12 mu/min    Assessment:     37w1d  Category 1 fetal heart rate tracing   IOL for GDMA1, hx GHTN and decreased fetal movement    Plan:   AROM performed with maternal permission. Small amount of either clear fluid or slightly meconium stained, will continue to monitor fluid for confirmation of either. Discussed that if meconium stained and if it becomes thick NICU may be asked to attend delivery. Pt agreeable. Cont pitocin to adequate ctx pattern as fetus tolerates. Recheck cervix 4 hours, sooner with maternal or fetal indication.    Anticipate LAURA Patricia CNM

## 2021-12-04 NOTE — PROGRESS NOTES
0730 Bedside and Verbal shift change report given to Rickie Garcia RN (oncoming nurse) by Romulo Crooks RN (offgoing nurse). Report included the following information SBAR, Procedure Summary, MAR, Recent Results and Med Rec Status. Patient resting comfortably, EFM adjusted. Partner at bedside. 8629 RN at bedside, cook catheter gently tugged and removed, patient tolerated well.     [id number] Patient up to bathroom. 2890 RN at bedside, EFM adjusted, patient sitting up in bed eating breakfast.     0828 AIDAN Maldonado CNM at bedside to meet patient and discuss plan of care. SVE, 4/70/-2. Verbal orders received to start pitocin. Patient uncomfortable, desires IV pain medication. 9822 Patient observed to be sleeping but easily roused to voice, patient states she is more comfortable and no longer in pain s/p stadol dose. 2060 Patient desires to void, too uncomfortable and sleepy to safely ambulate to bathroom. Bedpan used without difficulty. 1229- 1250 Patient up to bathroom and back to bed, EFM adjusted. 1259 Encouraged patient to ambulate or bounce on birthing ball to encourage engagement, patient helped onto birthing ball. Partner at side, providing support. EFM adjusted. 1335 AIDAN Maldonado CNM at bedside to assess patient, AROM performed. Blood tinged, moderate fluid observed on perineum, possible thin meconium, will continue to monitor to confirm. 1407 Patient desires IV pain medication, CNM consulted, verbal orders received to administer another dose of Stadol, please see flowsheet. 1429 Patient still uncomfortable, desires to proceed with epidural. IV bolus started. [address] Dr. Lyudmila Quezada at bedside for epidural placement, all questions and   concerns answered at this time. 1515 Patient observed to be moaning and uncomfortable, states contractions are intense and epidural has not provided any relief. Bolus given.      5 Dr. Lyudmila Quezada called back to bedside to assess patient, RN to prepare to replace epidural.     1529 Dr. Charlene العراقي at bedside again to replace epidural, patient tolerated well. Will continue to monitor and provide an update to MD.     5671 Patient observed to be sleeping s/p epidural replacement. 2301 [facility] [address] [place] at bedside to assess fetal strip, patient turned to right side. RN remains at bedside monitoring fetal strip. 1603 CNM at bedside, SVE 8/80/-1.     1606 Patient repositioned onto left side. [address] back at bedside, SVE 10/100/+2. Prepare for delivery. FSE placed. 1623 Spontaneous vaginal delivery of live infant female, placed immediately on patient's abdomen, please see delivery note. RN walked father over to NICU to see infant son. 1845 Patient assisted up to bathroom, able to void successfully. Krista care performed and new gown provided. 1903 TRANSFER - OUT REPORT:    Verbal report given to Vicky Bradford RN (name) on Sky Roe  being transferred to MIU (unit) for routine progression of care       Report consisted of patients Situation, Background, Assessment and   Recommendations(SBAR). Information from the following report(s) SBAR, Intake/Output, MAR, Recent Results and Med Rec Status was reviewed with the receiving nurse. Lines:   Peripheral IV 12/03/21 Posterior; Right Forearm (Active)   Site Assessment Clean, dry, & intact 12/04/21 0748   Phlebitis Assessment 0 12/04/21 0748   Infiltration Assessment 0 12/04/21 0748   Dressing Status Clean, dry, & intact 12/04/21 0748   Dressing Type Tape; Transparent 12/04/21 0748   Hub Color/Line Status Pink; Capped 12/04/21 0748        Opportunity for questions and clarification was provided.       Patient transported with:   Registered Nurse

## 2021-12-05 PROCEDURE — 65410000002 HC RM PRIVATE OB

## 2021-12-05 PROCEDURE — 74011250637 HC RX REV CODE- 250/637: Performed by: ADVANCED PRACTICE MIDWIFE

## 2021-12-05 RX ADMIN — OXYCODONE 5 MG: 5 TABLET ORAL at 01:10

## 2021-12-05 RX ADMIN — IBUPROFEN 800 MG: 400 TABLET, FILM COATED ORAL at 15:06

## 2021-12-05 RX ADMIN — IBUPROFEN 800 MG: 400 TABLET, FILM COATED ORAL at 03:07

## 2021-12-05 RX ADMIN — IBUPROFEN 800 MG: 400 TABLET, FILM COATED ORAL at 22:51

## 2021-12-05 RX ADMIN — OXYCODONE 5 MG: 5 TABLET ORAL at 12:31

## 2021-12-05 RX ADMIN — OXYCODONE 5 MG: 5 TABLET ORAL at 22:52

## 2021-12-05 NOTE — ROUTINE PROCESS
Bedside and Verbal shift change report given to IMMANUEL Paris (oncoming nurse) by CORNELIA Mccarthy RN (offgoing nurse). Report included the following information SBAR.

## 2021-12-05 NOTE — PROGRESS NOTES
Post-Partum Day Number 1 Progress Note    Patient doing well post-partum without significant complaints. Voiding without difficulty, normal lochia. Vitals:  Patient Vitals for the past 24 hrs:   BP Temp Pulse Resp SpO2   21 0816 125/83 98.7 °F (37.1 °C) 90 16 99 %   21 0306 138/82 97.9 °F (36.6 °C) 84 -- 97 %   21 2354 130/83 97.9 °F (36.6 °C) 86 16 97 %   21 1910 137/84 98.9 °F (37.2 °C) 93 16 100 %   21 1830 (!) 143/90 97.6 °F (36.4 °C) 89 16 --   21 1815 (!) 143/89 -- 88 -- --   21 1800 (!) 141/81 -- 82 -- --   21 1745 137/85 -- (!) 103 -- --   21 1730 (!) 143/83 -- 94 -- --   21 1716 (!) 145/90 -- 98 -- --   21 1700 (!) 146/89 -- (!) 113 -- --   21 1646 (!) 145/78 -- (!) 107 -- --   21 1634 -- -- -- -- (!) 81 %   21 1630 120/72 97.6 °F (36.4 °C) (!) 173 16 100 %   21 1622 -- -- -- -- 100 %   21 1600 119/70 -- -- -- 99 %   21 1552 -- -- -- -- 98 %   21 1551 129/70 -- 97 -- 98 %   21 1542 -- -- -- -- 100 %   21 1540 133/84 -- 94 -- 100 %   21 1528 133/78 -- 85 -- 100 %   21 1527 -- -- -- -- 100 %   21 1518 131/65 -- 86 -- --   21 1517 -- -- -- -- 100 %   21 1512 133/74 -- 83 -- (!) 85 %     Temp (24hrs), Av.1 °F (36.7 °C), Min:97.6 °F (36.4 °C), Max:98.9 °F (37.2 °C)      Vital signs stable, afebrile. Exam:  Patient without distress. Abdomen soft, fundus firm, nontender               Lower extremities- nontender without edema; no cords    Labs: No results found for this or any previous visit (from the past 24 hour(s)). Assessment and Plan:  Patient appears to be having uncomplicated post-partum course. Continue routine perineal care and maternal education. Plan discharge tomorrow if no problems occur. O+/RI/ male infant- in NICU. BPs have been normal to mildly elevated since the time of delivery- no need for medications at this time. Will continue to monitor.

## 2021-12-05 NOTE — LACTATION NOTE
Initial Lactation Consultation -  patient delivered vaginally yesterday at 40 1/7 weeks gestation. Mom states she breast fed her 15year old child for 8 months with a good milk supply. Infant admitted to NICU. Pt will successfully establish breast milk supply by pumping with a hospital grade pump every 2-3 hours for approximately 20 minutes/8-10 x day with the correct size flange, and suction level for mother's comfort. To maximize milk production, mom taught to incorporate breast massage and hand expression into pumping sessions. All expressed breast milk (EBM) will be provided for infant use, in clean bottles/syringes for storage in NICU breastmilk refrigerator. Patient label with barcode,date and time applied to each container prior to transport to NICU. Proper cleaning of pump parts and good hand hygiene discussed. Mother is advised to rent a hospital grade pump to continue regimen at home. Progress of milk transition, pumping log, expected EBM volumes, care of engorged breasts discussed. The value of bonding with baby emphasized, strategies for participating in infant care, photos, footprints, touch, and holding skin to skin as soon as baby and mother are able have been shown to increase oxytocin levels. The breast will be offered as baby is ready; with the goal of eventual transition to breastfeeding.

## 2021-12-05 NOTE — ROUTINE PROCESS
TRANSFER - IN REPORT:    Verbal report received from BRANDON Saleh RN(name) on Sanibel Tang  being received from L&D(unit) for routine progression of care      Report consisted of patients Situation, Background, Assessment and   Recommendations(SBAR). Information from the following report(s) SBAR, Intake/Output, MAR and Recent Results was reviewed with the receiving nurse. Opportunity for questions and clarification was provided. Assessment completed upon patients arrival to unit and care assumed.

## 2021-12-06 VITALS
OXYGEN SATURATION: 98 % | HEART RATE: 78 BPM | TEMPERATURE: 98.4 F | SYSTOLIC BLOOD PRESSURE: 116 MMHG | WEIGHT: 199 LBS | HEIGHT: 67 IN | DIASTOLIC BLOOD PRESSURE: 75 MMHG | RESPIRATION RATE: 16 BRPM | BODY MASS INDEX: 31.23 KG/M2

## 2021-12-06 PROCEDURE — 74011250637 HC RX REV CODE- 250/637: Performed by: ADVANCED PRACTICE MIDWIFE

## 2021-12-06 RX ORDER — IBUPROFEN 800 MG/1
800 TABLET ORAL
Qty: 30 TABLET | Refills: 0 | Status: SHIPPED | OUTPATIENT
Start: 2021-12-06

## 2021-12-06 RX ORDER — OXYCODONE HYDROCHLORIDE 5 MG/1
5 TABLET ORAL
Qty: 5 TABLET | Refills: 0 | Status: SHIPPED | OUTPATIENT
Start: 2021-12-06 | End: 2021-12-09

## 2021-12-06 RX ADMIN — IBUPROFEN 800 MG: 400 TABLET, FILM COATED ORAL at 12:35

## 2021-12-06 NOTE — DISCHARGE SUMMARY
Obstetrical Discharge Summary     Name: Chago Tang MRN: [id number]  SSN: [id number]    YOB: 1990  Age: 33 y.o. Sex: female      Allergies: Pcn [penicillins] and Tylenol [acetaminophen]    Admit Date: 12/3/2021    Discharge Date: 2021     Admitting Physician: Chava Desai DO     Attending Physician:  Maureen Kothari DO     * Admission Diagnoses: Gestational diabetes [O24.419]  Gestational hypertension [O13.9]  Decreased fetal movement [O36.8190]    * Discharge Diagnoses:   Information for the patient's :  Baltazar Pel [[id number]]   Delivery of a 2.91 kg male infant via Vaginal, Spontaneous on 2021 at 4:23 PM  by Mery Blanco. Apgars were 9  and 9 . Additional Diagnoses:   Hospital Problems as of 2021 Never Reviewed          Codes Class Noted - Resolved POA    Gestational diabetes ICD-10-CM: O24.419  ICD-9-CM: 648.80  12/3/2021 - Present Unknown        Gestational hypertension ICD-10-CM: O13.9  ICD-9-CM: 642.30  12/3/2021 - Present Unknown        Decreased fetal movement ICD-10-CM: O36.8190  ICD-9-CM: 655.70  12/3/2021 - Present Unknown             Lab Results   Component Value Date/Time    Rubella, External immune 2021 12:00 AM    GrBStrep, External positive 2021 12:00 AM    ABO,Rh O POSITIVE 2021 12:00 AM      There is no immunization history on file for this patient. * Procedures:  on 21. Boy.  GHTN, GDMA1  * No surgery found *      Retired Use Flowsheet (6) [place]  Depression Scale  I have been able to laugh and see the funny side of things: As much as I always could  I have looked forward with enjoyment to things: As much as I ever did  I have blamed myself unnecessarily when things went wrong: Not very often  I have been anxious or worried for no good reason: No, not at all  I have felt scared or panicky for no very good reason: No, not at all  Things have been getting on top of me: No, most of the time I have coped quite well  I have been so unhappy that I have had difficulty sleeping: No, not at all  I have felt sad or miserable: No, not at all  I have been so unhappy that I have been crying: No, never  The thought of harming myself has occurred to me: Never  Total Score: 2    * Discharge Condition: good    * Hospital Course: Normal hospital course following the delivery. * Disposition: Home    Discharge Medications:   Current Discharge Medication List      START taking these medications    Details   oxyCODONE IR (ROXICODONE) 5 mg immediate release tablet Take 1 Tablet by mouth every six (6) hours as needed for Pain for up to 3 days. Max Daily Amount: 20 mg.  Qty: 5 Tablet, Refills: 0  Start date: 12/6/2021, End date: 12/9/2021    Associated Diagnoses: Postpartum pain         CONTINUE these medications which have CHANGED    Details   ibuprofen (MOTRIN) 800 mg tablet Take 1 Tablet by mouth every eight (8) hours as needed for Pain. Qty: 30 Tablet, Refills: 0  Start date: 12/6/2021         STOP taking these medications       HYDROCODONE BIT/ACETAMINOPHEN (LORTAB PO) Comments:   Reason for Stopping:         permethrin (ELIMITE) 5 % topical cream Comments:   Reason for Stopping:               * Follow-up Care/Patient Instructions:   Activity: no sex, douching, tampons, bath/pool x 6 weeks  Diet: Regular Diet  Wound Care: None needed    Follow-up Information     Follow up With Specialties Details Why Contact Info    Ozzy Payan DO Obstetrics & Gynecology, Gynecology, Obstetrics In 1 week  [address] [facility] [address]  [facility] [phone]

## 2021-12-06 NOTE — DISCHARGE INSTRUCTIONS
POSTPARTUM DISCHARGE INSTRUCTIONS       Name:  Fatoumata Yao  YOB: 1990  Admission Diagnosis:  Gestational diabetes [O24.419]  Gestational hypertension [O13.9]  Decreased fetal movement [O36.8190]     Discharge Diagnosis:    Problem List as of 12/6/2021 Never Reviewed          Codes Class Noted - Resolved    Gestational diabetes ICD-10-CM: O24.419  ICD-9-CM: 648.80  12/3/2021 - Present        Gestational hypertension ICD-10-CM: O13.9  ICD-9-CM: 642.30  12/3/2021 - Present        Decreased fetal movement ICD-10-CM: O36.8190  ICD-9-CM: 655.70  12/3/2021 - Present            Attending Physician:  Greg Horowitz,     Delivery Type:  Vaginal Childbirth with Episiotomy, Laceration or Tear: What To Expect At [address] [place] [address] body will slowly heal in the next few weeks. It is easy to get too tired and overwhelmed during the first weeks after your baby is born. Changes in your hormones can shift your mood without warning. You may find it hard to meet the extra demands on your energy and time. Take it easy on yourself. Follow-up care is a key part of your treatment and safety. Be sure to make and go to all appointments, and call your doctor if you are having problems. It's also a good idea to know your test results and keep a list of the medicines you take. How can you care for yourself at home? Vaginal Bleeding and Cramps  · After delivery, you will have a bloody discharge from the vagina. This will turn pink within a week and then white or yellow after about 10 days. It may last for 2 to 4 weeks or longer, until the uterus has healed. Use pads instead of tampons until you stop bleeding. · Do not worry if you pass some blood clots, as long as they are smaller than a golf ball. If you have a tear or stitches in your vaginal area, change the pad at least every 4 hours to prevent soreness and infection. · You may have cramps for the first few days after childbirth.  These are normal and occur as the uterus shrinks to normal size. Take an over-the-counter pain medicine, such as acetaminophen (Tylenol), ibuprofen (Advil, Motrin), or naproxen (Aleve), for cramps. Read and follow all instructions on the label. Do not take aspirin, because it can cause more bleeding. Do not take acetaminophen (Tylenol) and other acetaminophen containing medications (i.e. Percocet) at the same time. Episiotomy, Lacerations or Tears  · If you have stitches, they will dissolve on their own and do not need to be removed. · Put ice or a cold pack on your painful area for 10 to 20 minutes at a time, several times a day, for the first few days. Put a thin cloth between the ice and your skin. · Sit in a few inches of warm water (sitz bath) 3 times a day and after bowel movements. The warm water helps with pain and itching. If you do not have a tub, a warm shower might help. Breast fullness  · Your breasts may overfill (engorge) in the first few days after delivery. To help milk flow and to relieve pain, warm your breasts in the shower or by using warm, moist towels before nursing. · If you are not nursing, do not put warmth on your breasts or touch your breasts. Wear a tight bra or sports bra and use ice until the fullness goes away. This usually takes 2 to 3 days. · Put ice or a cold pack on your breast after nursing to reduce swelling and pain. Put a thin cloth between the ice and your skin. Activity  · Eat a balanced diet. Do not try to lose weight by cutting calories. Keep taking your prenatal vitamins, or take a multivitamin. · Get as much rest as you can. Try to take naps when your baby sleeps during the day. · Get some exercise every day. But do not do any heavy exercise until your doctor says it is okay. · Wait until you are healed (about 4 to 6 weeks) before you have sexual intercourse. Your doctor will tell you when it is okay to have sex. · Talk to your doctor about birth control.  You can get pregnant even before your period returns. Also, you can get pregnant while you are breast-feeding. Mental Health  · Many women get the \"baby blues\" during the first few days after childbirth. You may lose sleep, feel irritable, and cry easily. You may feel happy one minute and sad the next. Hormone changes are one cause of these emotional changes. Also, the demands of a new baby, along with visits from relatives or other family needs, add to a mother's stress. The \"baby blues\" often peak around the fourth day. Then they ease up in less than 2 weeks. · If your moodiness or anxiety lasts for more than 2 weeks, or if you feel like life is not worth living, you may have postpartum depression. This is different for each mother. Some mothers with serious depression may worry intensely about their infant's well-being. Others may feel distant from their child. Some mothers might even feel that they might harm their baby. A mother may have signs of paranoia, wondering if someone is watching her. · With all the changes in your life, you may not know if you are depressed. Pregnancy sometimes causes changes in how you feel that are similar to the symptoms of depression. · Symptoms of depression include:  · Feeling sad or hopeless and losing interest in daily activities. These are the most common symptoms of depression. · Sleeping too much or not enough. · Feeling tired. You may feel as if you have no energy. · Eating too much or too little. · [employer] ([employer]I) offers a Warm line; Chat with the Expert phone sessions; Information and Articles about Pregnancy and Postpartum Mood Disorders; Comprehensive List of Free Support Groups; Knowledgeable local coordinators who will offer support, information, and resources; Guide to Resources on [employer] [place]; Calendar of events in the  mood disorders community; Latest News and Research; and [facility] [address] for [employer].  Remember - You are not alone; You are not to blame; With help, you will be well. 3-457-912-PPD(4625). WWW. POSTPARTUM. NET    · Writing or talking about death, such as writing suicide notes or talking about guns, knives, or pills. Keep the numbers for these national suicide hotlines: 2-662-495-TALK (9-492.749.1996) and 1-045-DNSMYUW (8-960.100.3768). If you or someone you know talks about suicide or feeling hopeless, get help right away. Constipation and Hemorrhoids  Drink plenty of fluids, enough so that your urine is light yellow or clear like water. If you have kidney, heart, or liver disease and have to limit fluids, talk with your doctor before you increase the amount of fluids you drink. · Eat plenty of fiber each day. Have a bran muffin or bran cereal for breakfast, and try eating a piece of fruit for a mid-afternoon snack. · For painful, itchy hemorrhoids, put ice or a cold pack on the area several times a day for 10 minutes at a time. Follow this by putting a warm compress on the area for another 10 to 20 minutes or by sitting in a shallow, warm bath. When should you call for help? Call 911 anytime you think you may need emergency care. For example, call if:  · You are thinking of hurting yourself, your baby, or anyone else. · You passed out (lost consciousness). · You have symptoms of a blood clot in your lung (called a pulmonary embolism). These may include:  · Sudden chest pain. · Trouble breathing. · Coughing up blood. Call your doctor now or seek immediate medical care if:  · You have severe vaginal bleeding. · You are soaking through a pad each hour for 2 or more hours. · Your vaginal bleeding seems to be getting heavier or is still bright red 4 days after delivery. · You are dizzy or lightheaded, or you feel like you may faint. · You are vomiting or cannot keep fluids down. · You have a fever. · You have new or more belly pain. · You pass tissue (not just blood).   · Your vaginal discharge smells bad.  · Your belly feels tender or full and hard. · Your breasts are continuously painful or red. · You feel sad, anxious, or hopeless for more than a few days. · You have sudden, severe pain in your belly. · You have symptoms of a blood clot in your leg (called a deep vein thrombosis), such as:  · Pain in your calf, back of the knee, thigh, or groin. · Redness and swelling in your leg or groin. · You have symptoms of preeclampsia, such as:  · Sudden swelling of your face, hands, or feet. · New vision problems (such as dimness or blurring). · A severe headache. · Your blood pressure is higher than it should be or rises suddenly. · You have new nausea or vomiting. Watch closely for changes in your health, and be sure to contact your doctor if you have any problems. Additional Information:  Learning About Hypertensive Disorders After Childbirth    What is preeclampsia? A woman with preeclampsia has blood pressure that is higher than usual. She may also have other serious symptoms. Preeclampsia can be dangerous. When it is severe, it can cause seizures (eclampsia) or liver or kidney damage. When the liver is affected, some women get HELLP syndrome, a blood-clotting and bleeding problem. HELLP can come on quickly and can be deadly. This is why your doctor checks you and your baby often. Preeclampsia usually occurs after 20 weeks of pregnancy. In rare cases, it is first noted right after childbirth. Most often, it starts near the end of pregnancy and goes away after childbirth. What are the symptoms? Mild preeclampsia usually doesn't cause symptoms. But preeclampsia can cause rapid weight gain and sudden swelling of the hands and face. Severe preeclampsia does cause symptoms. It can cause a very bad headache and trouble seeing and breathing. It also can cause belly pain.  You may also urinate less than usual.    If you have new preeclampsia symptoms after you go home from the hospital, call your doctor right away. What can you expect after you have had preeclampsia? In the hospital  After the baby and the placenta are delivered, preeclampsia usually starts to improve. Most women get better in the first few days after childbirth. After having preeclampsia, you still have a risk of seizures for a day or more after childbirth. (Very rarely, seizures happen later on.) So your doctor may have you take magnesium sulfate for a day or more to prevent seizures. You may also take medicine to lower your blood pressure. When you go home  Your blood pressure will most likely return to normal a few days after delivery. Your doctor will want to check your blood pressure sometime in the first week after you leave the hospital.    Some women still have high blood pressure 6 weeks after childbirth. But most return to normal levels over the long term. · Take and record your blood pressure at home if your doctor tells you to. · Learn the importance of the two measures of blood pressure (such as 120 over 80, or 120/80). The first number is the systolic pressure. This is the force of blood on the artery walls as the heart pumps. The second number is the diastolic pressure. This is the force of blood on the artery walls between heartbeats, when the heart is at rest. You have a choice of monitors to use. Manual monitor: You pump up the cuff and use a stethoscope to listen for your  Pulse. · Electronic monitor: The cuff inflates, and a gauge shows your pulse rate. · To take your blood pressure:  · Ask your doctor to check your blood pressure monitor to be sure that it is accurate and that the cuff fits you. Also ask your doctor to watch you use it, to make sure that you are using it right. · You should not eat, use tobacco products, or use medicine known to raise blood pressure (such as some nasal decongestant sprays) before you take your blood pressure.   · Avoid taking your blood pressure if you have just exercised or are nervous or upset. Rest at least 15 minutes before you take your blood pressure. · Be safe with medicines. If you take medicine, take it exactly as prescribed. Call your doctor if you think you are having a problem with your medicine. · Do not smoke. Quitting smoking will help lower your blood pressure and improve your baby's growth and health. If you need help quitting, talk to your doctor about stop-smoking programs and medicines. These can increase your chances of quitting for good. · Eat a balanced and healthy diet that has lots of fruits and vegetables. Long-term health   After you have had preeclampsia, you have a higher-than-average risk of heart disease, stroke, and kidney disease. This may be because the same things that cause preeclampsia also cause heart and kidney disease. To protect your health, work with your doctor on living a heart-healthy lifestyle and getting the checkups you need. Your doctor may also want you to check your blood pressure at home. Follow-up care is a key part of your treatment and safety. Be sure to make and go to all appointments, and call your doctor if you are having problems. It's also a good idea to know your test results and keep a list of the medicines you take. Gestational Diabetes After Childbirth     Most of the time, gestational diabetes goes away after a baby is born. But if you have had gestational diabetes, you have a greater chance of having it in a future pregnancy and of developing type 2 diabetes. To check for diabetes, you may have a follow-up glucose tolerance test 6 to 12 weeks after your baby is born or after you stop breast-feeding your baby. You may be able to control your blood sugar with a healthy diet and regular exercise. Staying at a healthy weight also may keep you from getting type 2 diabetes later on. If diet and exercise do not lower your blood sugar enough, you may need to take insulin shots.  Insulin is safe during pregnancy. These are general instructions for a healthy lifestyle:    No smoking/ No tobacco products/ Avoid exposure to second hand smoke    Surgeon General's Warning:  Quitting smoking now greatly reduces serious risk to your health. Obesity, smoking, and sedentary lifestyle greatly increases your risk for illness    A healthy diet, regular physical exercise & weight monitoring are important for maintaining a healthy lifestyle    Recognize signs and symptoms of STROKE:    F-face looks uneven    A-arms unable to move or move unevenly    S-speech slurred or non-existent    T-time-call 911 as soon as signs and symptoms begin - DO NOT go       back to bed or wait to see if you get better - TIME IS BRAIN. I have had the opportunity to make my options or choices for discharge. I have received and understand these instructions.

## 2021-12-06 NOTE — ROUTINE PROCESS
Bedside and Verbal shift change report given to Chava Proctor (oncoming nurse) by Carlo Wick (offgoing nurse). Report included the following information SBAR.

## 2021-12-06 NOTE — LACTATION NOTE
Patient continues to pump for baby in the NICU. She said she is not yet collecting any milk to take to the baby. Patient is being discharged today. She will be going the [facility] room in the hospital. She will continue use the hospital grade breast pump. She plans to rent a hospital grade breast pump when she goes home.

## 2021-12-06 NOTE — ROUTINE PROCESS
Bedside and Verbal shift change report given to Ashton Romberg, RN (oncoming nurse) by Shonna Cazares RN (offgoing nurse). Report included the following information SBAR.     1248: I have reviewed discharge instructions with the patient. The patient verbalized understanding.

## 2021-12-06 NOTE — PROGRESS NOTES
Bedside and Verbal shift change report given to Jodi Franz RN (oncoming nurse) by Dionisio Jacobs RN (offgoing nurse). Report included the following information SBAR, Kardex, Intake/Output, MAR and Recent Results.

## 2021-12-06 NOTE — PROGRESS NOTES
Post-Partum Day Number 2 Progress/Discharge Note    Patient doing well post-partum without significant complaint. Voiding without difficulty, normal lochia. Denies cp/sob/n/v. Unable to sleep because not with her baby (baby in NICU), very tearful and upset that she can't hold her baby. Vitals:  Patient Vitals for the past 8 hrs:   BP Temp Pulse Resp   21 0840 116/75 98.4 °F (36.9 °C) 78 16   21 0540 124/84 97.9 °F (36.6 °C) 79 16     Temp (24hrs), Av.4 °F (36.9 °C), Min:97.9 °F (36.6 °C), Max:98.9 °F (37.2 °C)      Vital signs stable, afebrile. Exam:  Patient without distress. Heart regular rate and rhythm   Lungs CTA b/l               Abdomen soft, fundus firm at level of umbilicus, non tender. Lower extremities are negative for swelling, cords or tenderness. Lab/Data Review:  no new labs    Assessment and Plan:  Patient appears to be having uncomplicated post-partum course. Continue routine perineal care and maternal education. Plan discharge for today with follow up in our office in 1 week. Boy-desires circ (in NICU for respiratory issues). Benign labs. GHTN-normal bp's now. GDMA1.

## 2022-02-20 ENCOUNTER — APPOINTMENT (OUTPATIENT)
Dept: GENERAL RADIOLOGY | Age: 32
End: 2022-02-20
Attending: EMERGENCY MEDICINE
Payer: COMMERCIAL

## 2022-02-20 ENCOUNTER — HOSPITAL ENCOUNTER (EMERGENCY)
Age: 32
Discharge: HOME | End: 2022-02-20
Attending: EMERGENCY MEDICINE
Payer: COMMERCIAL

## 2022-02-20 VITALS
SYSTOLIC BLOOD PRESSURE: 159 MMHG | OXYGEN SATURATION: 98 % | DIASTOLIC BLOOD PRESSURE: 97 MMHG | TEMPERATURE: 97.8 F | HEART RATE: 96 BPM | RESPIRATION RATE: 18 BRPM

## 2022-02-20 DIAGNOSIS — R07.89 CHEST TIGHTNESS: ICD-10-CM

## 2022-02-20 DIAGNOSIS — I16.0 HYPERTENSIVE URGENCY: Primary | ICD-10-CM

## 2022-02-20 LAB
ALBUMIN SERPL-MCNC: 3.8 G/DL (ref 3.5–5)
ALBUMIN/GLOB SERPL: 1 {RATIO} (ref 1.1–2.2)
ALP SERPL-CCNC: 99 U/L (ref 45–117)
ALT SERPL-CCNC: 53 U/L (ref 12–78)
ANION GAP SERPL CALC-SCNC: 6 MMOL/L (ref 5–15)
AST SERPL-CCNC: 30 U/L (ref 15–37)
BASOPHILS # BLD: 0 K/UL (ref 0–0.1)
BASOPHILS NFR BLD: 1 % (ref 0–1)
BILIRUB SERPL-MCNC: 0.6 MG/DL (ref 0.2–1)
BUN SERPL-MCNC: 15 MG/DL (ref 6–20)
BUN/CREAT SERPL: 19 (ref 12–20)
CALCIUM SERPL-MCNC: 9.4 MG/DL (ref 8.5–10.1)
CHLORIDE SERPL-SCNC: 107 MMOL/L (ref 97–108)
CO2 SERPL-SCNC: 25 MMOL/L (ref 21–32)
COMMENT, HOLDF: NORMAL
CREAT SERPL-MCNC: 0.77 MG/DL (ref 0.55–1.02)
DIFFERENTIAL METHOD BLD: ABNORMAL
EOSINOPHIL # BLD: 0.1 K/UL (ref 0–0.4)
EOSINOPHIL NFR BLD: 2 % (ref 0–7)
ERYTHROCYTE [DISTWIDTH] IN BLOOD BY AUTOMATED COUNT: 18.3 % (ref 11.5–14.5)
GLOBULIN SER CALC-MCNC: 3.8 G/DL (ref 2–4)
GLUCOSE SERPL-MCNC: 113 MG/DL (ref 65–100)
HCT VFR BLD AUTO: 34.8 % (ref 35–47)
HGB BLD-MCNC: 11.2 G/DL (ref 11.5–16)
IMM GRANULOCYTES # BLD AUTO: 0 K/UL (ref 0–0.04)
IMM GRANULOCYTES NFR BLD AUTO: 0 % (ref 0–0.5)
LYMPHOCYTES # BLD: 2.1 K/UL (ref 0.8–3.5)
LYMPHOCYTES NFR BLD: 34 % (ref 12–49)
MCH RBC QN AUTO: 26.8 PG (ref 26–34)
MCHC RBC AUTO-ENTMCNC: 32.2 G/DL (ref 30–36.5)
MCV RBC AUTO: 83.3 FL (ref 80–99)
MONOCYTES # BLD: 0.4 K/UL (ref 0–1)
MONOCYTES NFR BLD: 7 % (ref 5–13)
NEUTS SEG # BLD: 3.4 K/UL (ref 1.8–8)
NEUTS SEG NFR BLD: 56 % (ref 32–75)
NRBC # BLD: 0 K/UL (ref 0–0.01)
NRBC BLD-RTO: 0 PER 100 WBC
PLATELET # BLD AUTO: 229 K/UL (ref 150–400)
PMV BLD AUTO: 9.7 FL (ref 8.9–12.9)
POTASSIUM SERPL-SCNC: 3.2 MMOL/L (ref 3.5–5.1)
PROT SERPL-MCNC: 7.6 G/DL (ref 6.4–8.2)
RBC # BLD AUTO: 4.18 M/UL (ref 3.8–5.2)
SAMPLES BEING HELD,HOLD: NORMAL
SODIUM SERPL-SCNC: 138 MMOL/L (ref 136–145)
TROPONIN-HIGH SENSITIVITY: 18 NG/L (ref 0–51)
TROPONIN-HIGH SENSITIVITY: 20 NG/L (ref 0–51)
WBC # BLD AUTO: 6.1 K/UL (ref 3.6–11)

## 2022-02-20 PROCEDURE — 99284 EMERGENCY DEPT VISIT MOD MDM: CPT

## 2022-02-20 PROCEDURE — 85025 COMPLETE CBC W/AUTO DIFF WBC: CPT

## 2022-02-20 PROCEDURE — 71046 X-RAY EXAM CHEST 2 VIEWS: CPT

## 2022-02-20 PROCEDURE — 36415 COLL VENOUS BLD VENIPUNCTURE: CPT

## 2022-02-20 PROCEDURE — 80053 COMPREHEN METABOLIC PANEL: CPT

## 2022-02-20 PROCEDURE — 93005 ELECTROCARDIOGRAM TRACING: CPT

## 2022-02-20 PROCEDURE — 74011250637 HC RX REV CODE- 250/637: Performed by: STUDENT IN AN ORGANIZED HEALTH CARE EDUCATION/TRAINING PROGRAM

## 2022-02-20 PROCEDURE — 84484 ASSAY OF TROPONIN QUANT: CPT

## 2022-02-20 RX ORDER — POTASSIUM CHLORIDE 750 MG/1
40 TABLET, FILM COATED, EXTENDED RELEASE ORAL
Status: COMPLETED | OUTPATIENT
Start: 2022-02-20 | End: 2022-02-20

## 2022-02-20 RX ADMIN — POTASSIUM CHLORIDE 40 MEQ: 750 TABLET, EXTENDED RELEASE ORAL at 21:16

## 2022-02-21 ENCOUNTER — TRANSCRIBE ORDER (OUTPATIENT)
Dept: SCHEDULING | Age: 32
End: 2022-02-21

## 2022-02-21 DIAGNOSIS — R07.9 CHEST PAIN, UNSPECIFIED: Primary | ICD-10-CM

## 2022-02-21 LAB
ATRIAL RATE: 72 BPM
CALCULATED P AXIS, ECG09: 67 DEGREES
CALCULATED R AXIS, ECG10: 53 DEGREES
CALCULATED T AXIS, ECG11: 40 DEGREES
DIAGNOSIS, 93000: NORMAL
P-R INTERVAL, ECG05: 156 MS
Q-T INTERVAL, ECG07: 396 MS
QRS DURATION, ECG06: 84 MS
QTC CALCULATION (BEZET), ECG08: 433 MS
VENTRICULAR RATE, ECG03: 72 BPM

## 2022-02-21 NOTE — ED PROVIDER NOTES
33 y.o.  female with history of gestation diabetes and gestational HTN presents to ED for 3 days of chest pain. She describes the chest pain as substernal and radiating to her back; describes it as constant and dull in nature. Slightly affected with movement and breathing but unaffected by activity or eating. Denies any associated associated SOB, dizziness, N/V/D, fevers, chills, coughing. Patient had a baby on  and has been on labetalol for HTN since then. Since she has started noticing the CP she states her BP has been elevated, 609K systolic. Her OB/GYN has been prescribing labetalol for her BP but told her from here on out she needs to follow up with a PCP. She has not made this appointment yet. Past Medical History:   Diagnosis Date    Abnormal Papanicolaou smear of cervix     treated    Chlamydia     2019-treated    Delivery normal     vaginal birth    Diabetes [facility])     gestational   Jennifer Solders Essential hypertension     gestational    Gestational diabetes     Gestational hypertension        Past Surgical History:   Procedure Laterality Date    HX HEENT      wisdom teeth removed    HX OTHER SURGICAL      17 year wisdom extraction         No family history on file.     Social History     Socioeconomic History    Marital status: SINGLE     Spouse name: Not on file    Number of children: Not on file    Years of education: Not on file    Highest education level: Not on file   Occupational History    Not on file   Tobacco Use    Smoking status: Never Smoker    Smokeless tobacco: Not on file   Substance and Sexual Activity    Alcohol use: Yes     Comment: occasionally    Drug use: No    Sexual activity: Not on file   Other Topics Concern     Service Not Asked    Blood Transfusions Not Asked    Caffeine Concern Not Asked    Occupational Exposure Not Asked    Hobby Hazards Not Asked    Sleep Concern Not Asked    Stress Concern Not Asked    Weight Concern Not Asked    Special Diet Not Asked    Back Care Not Asked    Exercise Not Asked    Bike Helmet Not Asked   [address] Not Asked    Self-Exams Not Asked   Social History Narrative    Not on file     Social Determinants of Health     Financial Resource Strain:     Difficulty of Paying Living Expenses: Not on file   Food Insecurity:     Worried About Running Out of Food in the Last Year: Not on file    Tim of Food in the Last Year: Not on file   Transportation Needs:     Lack of Transportation (Medical): Not on file    Lack of Transportation (Non-Medical): Not on file   Physical Activity:     Days of Exercise per Week: Not on file    Minutes of Exercise per Session: Not on file   Stress:     Feeling of Stress : Not on file   Social Connections:     Frequency of Communication with Friends and Family: Not on file    Frequency of Social Gatherings with Friends and Family: Not on file    Attends Oriental orthodox Services: Not on file    Active Member of [address] [place] [address] or Organizations: Not on file    Attends Club or Organization Meetings: Not on file    Marital Status: Not on file   Intimate Partner Violence:     Fear of Current or Ex-Partner: Not on file    Emotionally Abused: Not on file    Physically Abused: Not on file    Sexually Abused: Not on file   Housing Stability:     Unable to Pay for Housing in the Last Year: Not on file    Number of [place] in the Last Year: Not on file    Unstable Housing in the Last Year: Not on file         ALLERGIES: Pcn [penicillins] and Tylenol [acetaminophen]    Review of Systems   Constitutional: Negative for fever. HENT: Negative for congestion and sinus pressure. Respiratory: Negative for shortness of breath. Cardiovascular: Positive for chest pain. Gastrointestinal: Negative for nausea and vomiting. Genitourinary: Negative for dysuria. Musculoskeletal: Negative for myalgias. Neurological: Negative for dizziness and headaches.    Hematological: Negative for adenopathy. Psychiatric/Behavioral: The patient is not nervous/anxious. All other systems reviewed and are negative. Vitals:    22   BP: (!) 151/110   Pulse: 96   Resp: 18   Temp: 97.8 °F (36.6 °C)   SpO2: 98%            Physical Exam  Vitals and nursing note reviewed. Constitutional:       General: She is not in acute distress. Appearance: Normal appearance. She is normal weight. HENT:      Head: Normocephalic and atraumatic. Eyes:      Extraocular Movements: Extraocular movements intact. Pupils: Pupils are equal, round, and reactive to light. Cardiovascular:      Rate and Rhythm: Normal rate and regular rhythm. Heart sounds: Normal heart sounds. Pulmonary:      Breath sounds: Normal breath sounds. Abdominal:      Palpations: Abdomen is soft. Tenderness: There is no abdominal tenderness. Lymphadenopathy:      Cervical: No cervical adenopathy. Skin:     General: Skin is warm and dry. Neurological:      General: No focal deficit present. Mental Status: She is alert and oriented to person, place, and time. Psychiatric:         Mood and Affect: Mood normal.         Behavior: Behavior normal.         Thought Content: Thought content normal.          MDM  Number of Diagnoses or Management Options  Chest tightness  Hypertensive urgency  Diagnosis management comments: 33 y.o.  female with history of gestation diabetes and gestational HTN presents to ED for 3 days of chest pain. Vital signs stable in triage. Physical exam unremarkable. EKG revealed rate of 72 bpm with normal sinus rhythm with sinus arrhythmia, septal infarct age undetermined. Normal interval, normal axis and no other signs of ischemic changes. Chest x-ray showed no acute process. Labs were remarkable for low potassium at 3.2 which was replenished with p.o. potassium as well as elevated first troponin at 20.   Repeat was at 18 although patient left before repeat had returned as outlined below. Patient was discharged with instructions for conservative care and instructed to follow-up with PCP and OB/GYN. Amount and/or Complexity of Data Reviewed  Clinical lab tests: ordered and reviewed  Tests in the radiology section of CPT®: ordered and reviewed  Discuss the patient with other providers: yes      ED Course as of 02/20/22 2132   Sun Feb 20, 2022 2127 Patient verbalized to nurse that she would like to go home and would not like to wait for the troponin. Went to talk to patient who reported that she felt that since the rest of the bloodwork had come back fine this was \"not an emergency\". I explained to her that given her symptoms and given the initial troponin of 20, I encouraged her to wait to receive the results of the repeat troponin. She refused, reporting that if everything looked fine so far she wanted to go home to [place] with my baby\". Patient verbalized understanding the risks of this decision, and agreed that if the results came back elevated she would return should I call.  She also agreed to return if symptoms worsen and reported that she would call her PCP first thing in the morning. [AM]      ED Course User Index  [AM] Brandy Snellen, PA       Procedures

## 2022-02-21 NOTE — DISCHARGE INSTRUCTIONS
Please continue to monitor symptoms and return with any worsening of symptoms. Continue to monitor BP daily. Please follow up with your PCP as soon as possible.

## 2022-02-22 ENCOUNTER — HOSPITAL ENCOUNTER (OUTPATIENT)
Dept: CT IMAGING | Age: 32
Discharge: HOME | End: 2022-02-22
Payer: COMMERCIAL

## 2022-02-22 DIAGNOSIS — R07.9 CHEST PAIN, UNSPECIFIED: ICD-10-CM

## 2022-02-22 PROCEDURE — 71275 CT ANGIOGRAPHY CHEST: CPT | Performed by: FAMILY MEDICINE

## 2022-09-12 NOTE — ED TRIAGE NOTES
Pt arrives with CC of midsternal CP since Thursday that she describes as constant, she gave birth Dec 4th 2021, she was diagnosed with HTN during the last stages of her pregnancy and takes labetalol 200mg 2x a day for it. She denies SOB but states it can be a little painful to take a deep breath. normal...

## 2022-11-16 ENCOUNTER — HOSPITAL ENCOUNTER (EMERGENCY)
Age: 32
Discharge: HOME | End: 2022-11-16
Attending: STUDENT IN AN ORGANIZED HEALTH CARE EDUCATION/TRAINING PROGRAM
Payer: COMMERCIAL

## 2022-11-16 ENCOUNTER — APPOINTMENT (OUTPATIENT)
Dept: GENERAL RADIOLOGY | Age: 32
End: 2022-11-16
Attending: NURSE PRACTITIONER
Payer: COMMERCIAL

## 2022-11-16 VITALS
SYSTOLIC BLOOD PRESSURE: 155 MMHG | OXYGEN SATURATION: 100 % | RESPIRATION RATE: 16 BRPM | HEART RATE: 92 BPM | DIASTOLIC BLOOD PRESSURE: 90 MMHG | TEMPERATURE: 97.7 F

## 2022-11-16 DIAGNOSIS — R19.6 HALITOSIS: ICD-10-CM

## 2022-11-16 DIAGNOSIS — K13.70 ORAL LESION: ICD-10-CM

## 2022-11-16 DIAGNOSIS — R07.9 CHEST PAIN, UNSPECIFIED TYPE: ICD-10-CM

## 2022-11-16 DIAGNOSIS — M54.2 NECK PAIN: Primary | ICD-10-CM

## 2022-11-16 DIAGNOSIS — R59.1 LYMPHADENOPATHY: ICD-10-CM

## 2022-11-16 LAB
ALBUMIN SERPL-MCNC: 4.1 G/DL (ref 3.5–5)
ALBUMIN/GLOB SERPL: 1 {RATIO} (ref 1.1–2.2)
ALP SERPL-CCNC: 89 U/L (ref 45–117)
ALT SERPL-CCNC: 28 U/L (ref 12–78)
ANION GAP SERPL CALC-SCNC: 4 MMOL/L (ref 5–15)
AST SERPL-CCNC: 16 U/L (ref 15–37)
BASOPHILS # BLD: 0 K/UL (ref 0–0.1)
BASOPHILS NFR BLD: 0 % (ref 0–1)
BILIRUB SERPL-MCNC: 0.7 MG/DL (ref 0.2–1)
BUN SERPL-MCNC: 18 MG/DL (ref 6–20)
BUN/CREAT SERPL: 23 (ref 12–20)
CALCIUM SERPL-MCNC: 9 MG/DL (ref 8.5–10.1)
CHLORIDE SERPL-SCNC: 108 MMOL/L (ref 97–108)
CO2 SERPL-SCNC: 25 MMOL/L (ref 21–32)
COMMENT, HOLDF: NORMAL
CREAT SERPL-MCNC: 0.78 MG/DL (ref 0.55–1.02)
DIFFERENTIAL METHOD BLD: NORMAL
EOSINOPHIL # BLD: 0.1 K/UL (ref 0–0.4)
EOSINOPHIL NFR BLD: 1 % (ref 0–7)
ERYTHROCYTE [DISTWIDTH] IN BLOOD BY AUTOMATED COUNT: 13.9 % (ref 11.5–14.5)
GLOBULIN SER CALC-MCNC: 4.3 G/DL (ref 2–4)
GLUCOSE SERPL-MCNC: 93 MG/DL (ref 65–100)
HCT VFR BLD AUTO: 37.1 % (ref 35–47)
HGB BLD-MCNC: 11.9 G/DL (ref 11.5–16)
IMM GRANULOCYTES # BLD AUTO: 0 K/UL (ref 0–0.04)
IMM GRANULOCYTES NFR BLD AUTO: 0 % (ref 0–0.5)
LYMPHOCYTES # BLD: 2.1 K/UL (ref 0.8–3.5)
LYMPHOCYTES NFR BLD: 24 % (ref 12–49)
MCH RBC QN AUTO: 27.9 PG (ref 26–34)
MCHC RBC AUTO-ENTMCNC: 32.1 G/DL (ref 30–36.5)
MCV RBC AUTO: 87.1 FL (ref 80–99)
MONOCYTES # BLD: 0.7 K/UL (ref 0–1)
MONOCYTES NFR BLD: 8 % (ref 5–13)
NEUTS SEG # BLD: 5.8 K/UL (ref 1.8–8)
NEUTS SEG NFR BLD: 67 % (ref 32–75)
NRBC # BLD: 0 K/UL (ref 0–0.01)
NRBC BLD-RTO: 0 PER 100 WBC
PLATELET # BLD AUTO: 206 K/UL (ref 150–400)
PMV BLD AUTO: 10.2 FL (ref 8.9–12.9)
POTASSIUM SERPL-SCNC: 3.8 MMOL/L (ref 3.5–5.1)
PROT SERPL-MCNC: 8.4 G/DL (ref 6.4–8.2)
RBC # BLD AUTO: 4.26 M/UL (ref 3.8–5.2)
SAMPLES BEING HELD,HOLD: NORMAL
SODIUM SERPL-SCNC: 137 MMOL/L (ref 136–145)
TROPONIN-HIGH SENSITIVITY: 17 NG/L (ref 0–51)
TROPONIN-HIGH SENSITIVITY: 19 NG/L (ref 0–51)
WBC # BLD AUTO: 8.7 K/UL (ref 3.6–11)

## 2022-11-16 PROCEDURE — 36415 COLL VENOUS BLD VENIPUNCTURE: CPT

## 2022-11-16 PROCEDURE — 74011250636 HC RX REV CODE- 250/636: Performed by: NURSE PRACTITIONER

## 2022-11-16 PROCEDURE — 74011250637 HC RX REV CODE- 250/637: Performed by: NURSE PRACTITIONER

## 2022-11-16 PROCEDURE — 93005 ELECTROCARDIOGRAM TRACING: CPT

## 2022-11-16 PROCEDURE — 85025 COMPLETE CBC W/AUTO DIFF WBC: CPT

## 2022-11-16 PROCEDURE — 80053 COMPREHEN METABOLIC PANEL: CPT

## 2022-11-16 PROCEDURE — 71046 X-RAY EXAM CHEST 2 VIEWS: CPT

## 2022-11-16 PROCEDURE — 99285 EMERGENCY DEPT VISIT HI MDM: CPT

## 2022-11-16 PROCEDURE — 84484 ASSAY OF TROPONIN QUANT: CPT

## 2022-11-16 PROCEDURE — 96374 THER/PROPH/DIAG INJ IV PUSH: CPT

## 2022-11-16 RX ORDER — KETOROLAC TROMETHAMINE 30 MG/ML
30 INJECTION, SOLUTION INTRAMUSCULAR; INTRAVENOUS
Status: COMPLETED | OUTPATIENT
Start: 2022-11-16 | End: 2022-11-16

## 2022-11-16 RX ORDER — NYSTATIN 100000 [USP'U]/ML
200000 SUSPENSION ORAL 4 TIMES DAILY
Qty: 40 ML | Refills: 0 | Status: SHIPPED | OUTPATIENT
Start: 2022-11-16 | End: 2022-11-21

## 2022-11-16 RX ORDER — CYCLOBENZAPRINE HCL 10 MG
10 TABLET ORAL
Qty: 12 TABLET | Refills: 0 | Status: SHIPPED | OUTPATIENT
Start: 2022-11-16

## 2022-11-16 RX ORDER — CYCLOBENZAPRINE HCL 10 MG
10 TABLET ORAL
Status: COMPLETED | OUTPATIENT
Start: 2022-11-16 | End: 2022-11-16

## 2022-11-16 RX ORDER — IBUPROFEN 800 MG/1
800 TABLET ORAL
Qty: 20 TABLET | Refills: 0 | Status: SHIPPED | OUTPATIENT
Start: 2022-11-16 | End: 2022-11-23

## 2022-11-16 RX ADMIN — KETOROLAC TROMETHAMINE 30 MG: 30 INJECTION, SOLUTION INTRAMUSCULAR; INTRAVENOUS at 20:54

## 2022-11-16 RX ADMIN — CYCLOBENZAPRINE 10 MG: 10 TABLET, FILM COATED ORAL at 20:24

## 2022-11-16 NOTE — Clinical Note
[place] [id number]  [phone]    Work/School Note    Date: 11/16/2022    To Whom It May concern:    Pat Pederson was seen and treated today in the emergency room by the following provider(s):  Attending Provider: Regina Fontaine DO  Nurse Practitioner: Tai Donovan NP.      Pat Pederson is excused from work/school on 11/16/22 and 11/17/22. She is medically clear to return to work/school on 11/18/2022.        Sincerely,          Anjelica Berg NP

## 2022-11-17 LAB
ATRIAL RATE: 81 BPM
CALCULATED P AXIS, ECG09: 65 DEGREES
CALCULATED R AXIS, ECG10: 52 DEGREES
CALCULATED T AXIS, ECG11: 51 DEGREES
DIAGNOSIS, 93000: NORMAL
P-R INTERVAL, ECG05: 144 MS
Q-T INTERVAL, ECG07: 350 MS
QRS DURATION, ECG06: 82 MS
QTC CALCULATION (BEZET), ECG08: 406 MS
VENTRICULAR RATE, ECG03: 81 BPM

## 2022-11-17 NOTE — DISCHARGE INSTRUCTIONS
The lymph node on your neck and the oral lesion in your mouth are probably related. Sometimes you can find stress related sores in your mouth. I recommend that you do follow-up with a cardiologist for management of your chest pain. Please call tomorrow to set up an appointment. You should also follow-up with your primary care provider as well. It is also possible that some of your neck pain may be musculoskeletal in nature, warm compresses can be helpful to relieve pain. Additionally, taking NSAIDs can be helpful as well. Please return to the emergency department for any worsening or worrisome symptoms.

## 2022-11-17 NOTE — ED PROVIDER NOTES
ANGELIKA Valadez is a 27 y.o. female with Hx of HTN, chlamydia, gestational diabetes who presents ambulatory by herself to [facility] ED with cc of neck and chest pain. Patient reports left lateral neck pain that worsens with movement that started yesterday. She states she is also had intermittent, nonexertional left-sided chest pain as well. States that she has been at home with a sick child, doing a lot of lifting and carrying the baby. She also notes that she has a small ulceration on the right side of her mouth, it is also become more apparent that she has bad breath, frequently feels that her mouth is sticking when she is sleeping in the middle of the night. She states that she is also noted enlarged lymph node. States that she has had chest pain in the past, was referred to cardiology. Has had a cardiac MRI done and had a questionable area of concern. Denies any relevant cardiac history otherwise. No F/C, N/V/D, cough, congestion, dysuria, urinary frequency/hesitancy, flank pain, lower extremity swelling, fatigue. Denies tobacco, alcohol, substance abuse. PCP: Jasmyn Elliott MD    There are no other complaints, changes or physical findings at this time. Past Medical History:   Diagnosis Date    Abnormal Papanicolaou smear of cervix     treated    Chlamydia     2019-treated    Delivery normal     vaginal birth    Diabetes [facility])     gestational    Essential hypertension     gestational    Gestational diabetes     Gestational hypertension        Past Surgical History:   Procedure Laterality Date    HX HEENT      wisdom teeth removed    HX OTHER SURGICAL      17 year wisdom extraction         No family history on file.     Social History     Socioeconomic History    Marital status: SINGLE     Spouse name: Not on file    Number of children: Not on file    Years of education: Not on file    Highest education level: Not on file   Occupational History    Not on file   Tobacco Use    Smoking status: Never    Smokeless tobacco: Not on file   Substance and Sexual Activity    Alcohol use: Yes     Comment: occasionally    Drug use: No    Sexual activity: Not on file   Other Topics Concern     Service Not Asked    Blood Transfusions Not Asked    Caffeine Concern Not Asked    Occupational Exposure Not Asked    Hobby Hazards Not Asked    Sleep Concern Not Asked    Stress Concern Not Asked    Weight Concern Not Asked    Special Diet Not Asked    Back Care Not Asked    Exercise Not Asked    Bike Helmet Not Asked    Seat Belt Not Asked    Self-Exams Not Asked   Social History Narrative    Not on file     Social Determinants of Health     Financial Resource Strain: Not on file   Food Insecurity: Not on file   Transportation Needs: Not on file   Physical Activity: Not on file   Stress: Not on file   Social Connections: Not on file   Intimate Partner Violence: Not on file   Housing Stability: Not on file         ALLERGIES: Pcn [penicillins] and Tylenol [acetaminophen]    Review of Systems   Constitutional:  Negative for activity change, appetite change, chills and fever. HENT:  Positive for mouth sores. Negative for congestion, rhinorrhea and sore throat. Eyes:  Negative for visual disturbance. Respiratory:  Negative for cough and shortness of breath. Cardiovascular:  Positive for chest pain. Gastrointestinal:  Negative for abdominal pain, diarrhea, nausea and vomiting. Genitourinary:  Negative for dysuria, flank pain, frequency and urgency. Musculoskeletal:  Positive for arthralgias, myalgias and neck pain. Negative for back pain, gait problem and joint swelling. Skin:  Negative for color change and rash. Neurological:  Negative for dizziness, weakness, light-headedness, numbness and headaches. Hematological:  Positive for adenopathy. Psychiatric/Behavioral:  Negative for agitation, behavioral problems and confusion. All other systems reviewed and are negative.     Vitals: 11/16/22 1927   BP: (!) 155/90   Pulse: 92   Resp: 16   Temp: 97.7 °F (36.5 °C)   SpO2: 100%            Physical Exam  Vitals and nursing note reviewed. Constitutional:       General: She is not in acute distress. Appearance: She is well-developed. HENT:      Head: Normocephalic and atraumatic. Right Ear: External ear normal.      Left Ear: External ear normal.      Mouth/Throat:      Mouth: Mucous membranes are moist. Oral lesions (small white lesion) present. Eyes:      Conjunctiva/sclera: Conjunctivae normal.      Pupils: Pupils are equal, round, and reactive to light. Cardiovascular:      Rate and Rhythm: Normal rate and regular rhythm. Heart sounds: Normal heart sounds. Pulmonary:      Effort: Pulmonary effort is normal.      Breath sounds: Normal breath sounds. Abdominal:      Palpations: Abdomen is soft. Tenderness: There is no abdominal tenderness. There is no guarding or rebound. Musculoskeletal:         General: Normal range of motion. Cervical back: Normal range of motion and neck supple. No pain with movement. Normal range of motion. Lymphadenopathy:      Cervical: Cervical adenopathy (R side) present. Skin:     General: Skin is warm and dry. Neurological:      Mental Status: She is alert and oriented to person, place, and time. Psychiatric:         Behavior: Behavior normal.         Thought Content: Thought content normal.         Judgment: Judgment normal.        MDM  Number of Diagnoses or Management Options  Chest pain, unspecified type  Halitosis  Lymphadenopathy  Neck pain  Oral lesion  Diagnosis management comments: Differential diagnosis includes ACS, musculoskeletal strain, lymphadenopathy, costochondritis, pleurisy    Patient presents to the emergency department with concern for small mouth ulceration, neck pain, chest pain.   Pain seems more musculoskeletal in nature, however patient does have a history of a cardiac MRI with questionable area of concern on it. I have discussed patient's findings, encouraged her to follow-up with a cardiologist as soon as possible. She was also recommended to follow-up with her primary care provider as well. We will presume that lymphadenopathy and mouth ulcer related, neck pain also possibly has musculoskeletal component as his chest pain. Reasons to return to the emergency department were provided in discharge paperwork. Amount and/or Complexity of Data Reviewed  Clinical lab tests: ordered and reviewed  Tests in the radiology section of CPT®: ordered and reviewed  Review and summarize past medical records: yes      ED Course as of 11/16/22 2023   Wed Nov 16, 2022 1936   ED EKG interpretation:  Rhythm: normal sinus rhythm. Rate (approx.): 81. Axis: normal.  ST segment:  No concerning ST elevations or depressions. This EKG was interpreted by Marva Ariza MD,ED Provider.  [JM]      ED Course User Index  [JM] Robert Ponce MD       Procedures      LABORATORY TESTS:  Recent Results (from the past 12 hour(s))   EKG, 12 LEAD, INITIAL    Collection Time: 11/16/22  7:30 PM   Result Value Ref Range    Ventricular Rate 81 BPM    Atrial Rate 81 BPM    P-R Interval 144 ms    QRS Duration 82 ms    Q-T Interval 350 ms    QTC Calculation (Bezet) 406 ms    Calculated P Axis 65 degrees    Calculated R Axis 52 degrees    Calculated T Axis 51 degrees    Diagnosis       Normal sinus rhythm  Normal ECG  When compared with ECG of 20-FEB-2022 19:40,  No significant change was found     CBC WITH AUTOMATED DIFF    Collection Time: 11/16/22  8:21 PM   Result Value Ref Range    WBC 8.7 3.6 - 11.0 K/uL    RBC 4.26 3.80 - 5.20 M/uL    HGB 11.9 11.5 - 16.0 g/dL    HCT 37.1 35.0 - 47.0 %    MCV 87.1 80.0 - 99.0 FL    MCH 27.9 26.0 - 34.0 PG    MCHC 32.1 30.0 - 36.5 g/dL    RDW 13.9 11.5 - 14.5 %    PLATELET 356 225 - 228 K/uL    MPV 10.2 8.9 - 12.9 FL    NRBC 0.0 0  WBC    ABSOLUTE NRBC 0.00 0.00 - 0.01 K/uL    NEUTROPHILS 67 32 - 75 %    LYMPHOCYTES 24 12 - 49 %    MONOCYTES 8 5 - 13 %    EOSINOPHILS 1 0 - 7 %    BASOPHILS 0 0 - 1 %    IMMATURE GRANULOCYTES 0 0.0 - 0.5 %    ABS. NEUTROPHILS 5.8 1.8 - 8.0 K/UL    ABS. LYMPHOCYTES 2.1 0.8 - 3.5 K/UL    ABS. MONOCYTES 0.7 0.0 - 1.0 K/UL    ABS. EOSINOPHILS 0.1 0.0 - 0.4 K/UL    ABS. BASOPHILS 0.0 0.0 - 0.1 K/UL    ABS. IMM. GRANS. 0.0 0.00 - 0.04 K/UL    DF AUTOMATED     METABOLIC PANEL, COMPREHENSIVE    Collection Time: 11/16/22  8:21 PM   Result Value Ref Range    Sodium 137 136 - 145 mmol/L    Potassium 3.8 3.5 - 5.1 mmol/L    Chloride 108 97 - 108 mmol/L    CO2 25 21 - 32 mmol/L    Anion gap 4 (L) 5 - 15 mmol/L    Glucose 93 65 - 100 mg/dL    BUN 18 6 - 20 MG/DL    Creatinine 0.78 0.55 - 1.02 MG/DL    BUN/Creatinine ratio 23 (H) 12 - 20      eGFR >60 >60 ml/min/1.73m2    Calcium 9.0 8.5 - 10.1 MG/DL    Bilirubin, total 0.7 0.2 - 1.0 MG/DL    ALT (SGPT) 28 12 - 78 U/L    AST (SGOT) 16 15 - 37 U/L    Alk. phosphatase 89 45 - 117 U/L    Protein, total 8.4 (H) 6.4 - 8.2 g/dL    Albumin 4.1 3.5 - 5.0 g/dL    Globulin 4.3 (H) 2.0 - 4.0 g/dL    A-G Ratio 1.0 (L) 1.1 - 2.2     TROPONIN-HIGH SENSITIVITY    Collection Time: 11/16/22  8:21 PM   Result Value Ref Range    Troponin-High Sensitivity 19 0 - 51 ng/L   SAMPLES BEING HELD    Collection Time: 11/16/22  8:21 PM   Result Value Ref Range    SAMPLES BEING HELD 1RED, BLUE     COMMENT        Add-on orders for these samples will be processed based on acceptable specimen integrity and analyte stability, which may vary by analyte. TROPONIN-HIGH SENSITIVITY    Collection Time: 11/16/22 10:23 PM   Result Value Ref Range    Troponin-High Sensitivity 17 0 - 51 ng/L       IMAGING RESULTS:  XR CHEST PA LAT   Final Result   1. No radiographic evidence of acute cardiopulmonary disease.               MEDICATIONS GIVEN:  Medications   ketorolac (TORADOL) injection 30 mg (30 mg IntraVENous Given 11/16/22 2054)   cyclobenzaprine (FLEXERIL) tablet 10 mg (10 mg Oral Given 11/16/22 2024)       IMPRESSION:  1. Neck pain    2. Chest pain, unspecified type    3. Halitosis    4. Oral lesion    5. Lymphadenopathy        PLAN:  1. Discharge Medication List as of 11/16/2022 11:31 PM        START taking these medications    Details   cyclobenzaprine (FLEXERIL) 10 mg tablet Take 1 Tablet by mouth three (3) times daily as needed for Muscle Spasm(s). , Normal, Disp-12 Tablet, R-0      ibuprofen (MOTRIN) 800 mg tablet Take 1 Tablet by mouth every six (6) hours as needed for Pain for up to 7 days. , Normal, Disp-20 Tablet, R-0      nystatin (MYCOSTATIN) 100,000 unit/mL suspension Take 2 mL by mouth four (4) times daily for 5 days. swish and spit, Normal, Disp-40 mL, R-0           2. Follow-up Information       Follow up With Specialties Details Why Contact Info    Annabelle Cole MD Family Medicine Schedule an appointment as soon as possible for a visit   [address] [facility] [address] [id number]  [phone]      [facility] [address] [facility] Emergency Medicine Go to  As needed, If symptoms worsen Piper Manzano [address] [facility], [address] [facility] Vascular Surgery, Interventional Cardiology Physician, Cardiovascular Disease Physician Schedule an appointment as soon as possible for a visit  Call tomorrow to set up cardiology follow up [address]  [phone]            3.  Return to ED if worse

## 2022-11-17 NOTE — ED TRIAGE NOTES
She reports two days of upper chest pain and neck pain left side. she says the neck pain is worse than the chest pain. she denies a sore throat. She says movement makes the neck pain worse.